# Patient Record
Sex: FEMALE | Race: WHITE | NOT HISPANIC OR LATINO | Employment: UNEMPLOYED | URBAN - METROPOLITAN AREA
[De-identification: names, ages, dates, MRNs, and addresses within clinical notes are randomized per-mention and may not be internally consistent; named-entity substitution may affect disease eponyms.]

---

## 2022-11-23 ENCOUNTER — PATIENT OUTREACH (OUTPATIENT)
Dept: CARE COORDINATION | Facility: CLINIC | Age: 57
End: 2022-11-23

## 2022-11-23 ENCOUNTER — OFFICE VISIT (OUTPATIENT)
Dept: INTERNAL MEDICINE | Facility: OTHER | Age: 57
End: 2022-11-23
Attending: NURSE PRACTITIONER
Payer: MEDICARE

## 2022-11-23 VITALS
TEMPERATURE: 97.1 F | HEART RATE: 45 BPM | BODY MASS INDEX: 28.35 KG/M2 | WEIGHT: 180.6 LBS | HEIGHT: 67 IN | RESPIRATION RATE: 16 BRPM | DIASTOLIC BLOOD PRESSURE: 86 MMHG | OXYGEN SATURATION: 97 % | SYSTOLIC BLOOD PRESSURE: 138 MMHG

## 2022-11-23 DIAGNOSIS — F33.3 SEVERE EPISODE OF RECURRENT MAJOR DEPRESSIVE DISORDER, WITH PSYCHOTIC FEATURES (H): ICD-10-CM

## 2022-11-23 DIAGNOSIS — I47.10 PAROXYSMAL SUPRAVENTRICULAR TACHYCARDIA (H): ICD-10-CM

## 2022-11-23 DIAGNOSIS — F42.2 MIXED OBSESSIONAL THOUGHTS AND ACTS: ICD-10-CM

## 2022-11-23 DIAGNOSIS — R00.1 BRADYCARDIA: Primary | ICD-10-CM

## 2022-11-23 DIAGNOSIS — F31.9 BIPOLAR I DISORDER (H): ICD-10-CM

## 2022-11-23 DIAGNOSIS — F14.10 COCAINE ABUSE (H): ICD-10-CM

## 2022-11-23 PROCEDURE — 93010 ELECTROCARDIOGRAM REPORT: CPT | Performed by: INTERNAL MEDICINE

## 2022-11-23 PROCEDURE — 99204 OFFICE O/P NEW MOD 45 MIN: CPT | Performed by: NURSE PRACTITIONER

## 2022-11-23 PROCEDURE — 93005 ELECTROCARDIOGRAM TRACING: CPT | Performed by: NURSE PRACTITIONER

## 2022-11-23 PROCEDURE — G0463 HOSPITAL OUTPT CLINIC VISIT: HCPCS

## 2022-11-23 RX ORDER — CYANOCOBALAMIN (VITAMIN B-12) 500 MCG
1 LOZENGE ORAL
COMMUNITY
End: 2022-11-23

## 2022-11-23 RX ORDER — IBUPROFEN 600 MG/1
TABLET, FILM COATED ORAL
COMMUNITY
Start: 2020-12-29 | End: 2022-11-23

## 2022-11-23 RX ORDER — BIOTIN 10 MG
1 TABLET ORAL
COMMUNITY
End: 2022-11-23

## 2022-11-23 RX ORDER — B-COMPLEX WITH VITAMIN C
1 TABLET ORAL
COMMUNITY
End: 2022-11-23

## 2022-11-23 ASSESSMENT — ANXIETY QUESTIONNAIRES
7. FEELING AFRAID AS IF SOMETHING AWFUL MIGHT HAPPEN: NOT AT ALL
GAD7 TOTAL SCORE: 9
1. FEELING NERVOUS, ANXIOUS, OR ON EDGE: MORE THAN HALF THE DAYS
IF YOU CHECKED OFF ANY PROBLEMS ON THIS QUESTIONNAIRE, HOW DIFFICULT HAVE THESE PROBLEMS MADE IT FOR YOU TO DO YOUR WORK, TAKE CARE OF THINGS AT HOME, OR GET ALONG WITH OTHER PEOPLE: VERY DIFFICULT
3. WORRYING TOO MUCH ABOUT DIFFERENT THINGS: SEVERAL DAYS
5. BEING SO RESTLESS THAT IT IS HARD TO SIT STILL: MORE THAN HALF THE DAYS
6. BECOMING EASILY ANNOYED OR IRRITABLE: MORE THAN HALF THE DAYS
2. NOT BEING ABLE TO STOP OR CONTROL WORRYING: SEVERAL DAYS
GAD7 TOTAL SCORE: 9

## 2022-11-23 ASSESSMENT — PATIENT HEALTH QUESTIONNAIRE - PHQ9
SUM OF ALL RESPONSES TO PHQ QUESTIONS 1-9: 21
10. IF YOU CHECKED OFF ANY PROBLEMS, HOW DIFFICULT HAVE THESE PROBLEMS MADE IT FOR YOU TO DO YOUR WORK, TAKE CARE OF THINGS AT HOME, OR GET ALONG WITH OTHER PEOPLE: EXTREMELY DIFFICULT
SUM OF ALL RESPONSES TO PHQ QUESTIONS 1-9: 21
5. POOR APPETITE OR OVEREATING: SEVERAL DAYS

## 2022-11-23 ASSESSMENT — ENCOUNTER SYMPTOMS
CHEST TIGHTNESS: 0
FATIGUE: 0
HALLUCINATIONS: 0
DYSPHORIC MOOD: 1
NERVOUS/ANXIOUS: 1
SHORTNESS OF BREATH: 0
PALPITATIONS: 0

## 2022-11-23 ASSESSMENT — PAIN SCALES - GENERAL: PAINLEVEL: NO PAIN (0)

## 2022-11-23 NOTE — PROGRESS NOTES
Clinic Care Coordination Contact  Care Team Conversations    Met with in clinic. Dinorah was very upset and hard to keep focused. She was expecting that she would be prescribed MH medication at her appt today.    She became more upset and adjutanted when I informed her she would have to get an appt with outside resource for medication management.She wanted her meds today/now.    She finally agreed to have consult with one of our local agencies but wants this writer to make the calls and set up appt.    ETHAN Camacho on 11/23/2022 at 10:28 AM      Clinic Care Coordination Contact  Care Team Conversations    Called all the local providers in the area; [place], [facility], [facility], [place] and [facility] and left messages.        ETHAN Camacho on 11/23/2022 at 2:00 PM

## 2022-11-23 NOTE — PROGRESS NOTES
ICD-10-CM    1. Bradycardia  R00.1 EKG 12-lead, tracing only      2. Paroxysmal supraventricular tachycardia (H)  I47.1       3. Cocaine abuse (H)  F14.10 Primary Care - Care Coordination Referral      4. Bipolar I disorder (H)  F31.9 Primary Care - Care Coordination Referral      5. Severe episode of recurrent major depressive disorder, with psychotic features (H)  F33.3 Primary Care - Care Coordination Referral      6. Mixed obsessional thoughts and acts  F42.2 Primary Care - Care Coordination Referral        Plan:  Patient is bradycardic today.  By reviewing ED notes I am finding reports of PSVT.  Reviewed and discussed etiology.  I think as long as patient is safe from physical standpoint at this time and not having acute coronary symptoms we can hold off a few weeks to do further work-up.  With history of cocaine use should investigate further with echocardiogram and heart monitor.  Typically cocaine use causes acute cardiac symptoms.  I have placed referral for clinic coordinator to work with patient to get her reestablished with a psychiatric med management provider as soon as possible.  I think she is going to benefit from being restarted on medications for mental health and then we can proceed with further work-up.  She would like to get physical scheduled in the near future.  I will see patient back in 3-4 weeks for follow-up and healthcare maintenance.  Reviewed and discussed with patient and her mother that if she develops any symptoms of acute cardiac symptoms that she should be evaluated urgently.    Barby Rashid is a 57 year old, presenting for the following health issues:  Tachycardia and Anxiety    Patient is here today to establish care and also to discuss heart palpitations.  [place]  She was brought to ED by EMS and was given a medication intravenously for cardioversion.  Heart palpitations  stopped.  With a heart palpitation she was having chest pressure.  She had been abusing cocaine for couple years on a daily basis.  By reviewing emergency department note there is some discussion of PSVT as being the arrhythmia.  Patient states that she is not having any current heart racing and sometimes really cannot tell if she is having symptoms of any type of illness due to her current untreated mental health.  Since she was a young adult she has been treated for bipolar 1 disorder, MDD and OCD.  She was on medication for around 20 years and was very stable and then a couple of years ago medication stopped working.  Since medications were not working she stopped taking those and started abusing crack cocaine daily whereas prior was using maybe once a month for enjoyment.  During that 2-year timeframe she was stealing, abusing crack cocaine and living a high risk lifestyle.  She ended up in prison.  Since then she has moved in with her mother in this area and is trying to get her life straight.  She is not currently on any psychotropics.  She does not have an appointment with mental health provider for prescribing.  She would like to get started on medications and hopefully find the right formula to stabilize her mental health.    History of Present Illness       Mental Health Follow-up:  Patient presents to follow-up on Depression.Patient's depression since last visit has been:  Worse  The patient is having other symptoms associated with depression.      Any significant life events: housing concerns, grief or loss and health concerns  Patient is not feeling anxious or having panic attacks.  Patient has no concerns about alcohol or drug use.    Vascular Disease:  She presents for follow up of vascular disease.  She never takes nitroglycerin. She is not taking daily aspirin.    She eats 0-1 servings of fruits and vegetables daily.She consumes 1 sweetened beverage(s) daily.She exercises with enough effort to  "increase her heart rate 9 or less minutes per day.  She exercises with enough effort to increase her heart rate 3 or less days per week.   She is taking medications regularly.    Today's PHQ-9         PHQ-9 Total Score: 21    PHQ-9 Q9 Thoughts of better off dead/self-harm past 2 weeks :   Not at all    How difficult have these problems made it for you to do your work, take care of things at home, or get along with other people: Extremely difficult             Review of Systems   Constitutional: Negative for fatigue.   Respiratory: Negative for chest tightness and shortness of breath.    Cardiovascular: Negative for chest pain, palpitations and peripheral edema.   Psychiatric/Behavioral: Positive for dysphoric mood. Negative for hallucinations. The patient is nervous/anxious.             Objective    /86 (BP Location: Right arm, Patient Position: Sitting, Cuff Size: Adult Regular)   Pulse (!) 45   Temp 97.1  F (36.2  C) (Tympanic)   Resp 16   Ht 1.702 m (5' 7\")   Wt 81.9 kg (180 lb 9.6 oz)   SpO2 97%   BMI 28.29 kg/m    Body mass index is 28.29 kg/m .  Physical Exam   Patient is anxious and speech is tangential.  Difficult historian but I was able to make some sense of the conversation and her needs today.  Skin color pink.  Neck supple and without adenopathy.  No thyromegaly.  Lung fields clear to auscultation throughout.  Cardiovascular regular rate and rhythm with no murmur, clicks, rubs, S3 or S4.  Apical pulse 46.  Abdomen is soft and without tenderness.  No abdominal bruits or pulsatile masses.  Extremities without edema.  Gait stable.  Groomed and dressed appropriately.  At the end of appointment patient's mother was also in the room.  EKG: Sinus bradycardia, rate 51, left axis deviation and right bundle branch block.    ED notes reviewed and discussed                  "

## 2022-11-23 NOTE — PROGRESS NOTES
Clinic Care Coordination Contact  Care Team Conversations    Called and spoke with Dinorah and provided her the update. She was upset to hear she couldn't get into to med management until Jan 15th. She is asking to have depakote and Wellburtin until she can be seen.     Advised her that she can be assessed in ED if she is in crisis and/or call 211 to have an assessment.    She became tearful and upset that she wouldn't be able to get her medication refilled at this time.    Talked with her about calling a past provider to see if they would refill or complete a tele-health knowing she has an appt Jan 16th.    ETHAN Camacho on 11/23/2022 at 2:27 PM

## 2022-11-23 NOTE — PROGRESS NOTES
Clinic Care Coordination Contact  Care Team Conversations    Called and there was no answer, unable to leave a message.    ETHAN Camacho on 11/23/2022 at 1:56 PM

## 2022-11-23 NOTE — NURSING NOTE
"Chief Complaint   Patient presents with     Tachycardia     Anxiety       FOOD SECURITY SCREENING QUESTIONS  Hunger Vital Signs:  Within the past 12 months we worried whether our food would run out before we got money to buy more. Never  Within the past 12 months the food we bought just didn't last and we didn't have money to get more. Never  Shereen Clements LPN 11/23/2022 9:10 AM      Initial /86 (BP Location: Right arm, Patient Position: Sitting, Cuff Size: Adult Regular)   Pulse (!) 45   Temp 97.1  F (36.2  C) (Tympanic)   Resp 16   Ht 1.702 m (5' 7\")   Wt 81.9 kg (180 lb 9.6 oz)   SpO2 97%   BMI 28.29 kg/m   Estimated body mass index is 28.29 kg/m  as calculated from the following:    Height as of this encounter: 1.702 m (5' 7\").    Weight as of this encounter: 81.9 kg (180 lb 9.6 oz).  Medication Reconciliation: complete    Shereen Clements LPN  "

## 2022-11-25 ENCOUNTER — TELEPHONE (OUTPATIENT)
Dept: INTERNAL MEDICINE | Facility: OTHER | Age: 57
End: 2022-11-25

## 2022-11-25 DIAGNOSIS — F31.9 BIPOLAR I DISORDER (H): Primary | ICD-10-CM

## 2022-11-25 DIAGNOSIS — F33.3 SEVERE EPISODE OF RECURRENT MAJOR DEPRESSIVE DISORDER, WITH PSYCHOTIC FEATURES (H): ICD-10-CM

## 2022-11-25 RX ORDER — DIVALPROEX SODIUM 500 MG/1
500 TABLET, EXTENDED RELEASE ORAL DAILY
Qty: 30 TABLET | Refills: 11 | Status: SHIPPED | OUTPATIENT
Start: 2022-11-25 | End: 2023-10-25

## 2022-11-25 RX ORDER — BUPROPION HYDROCHLORIDE 100 MG/1
TABLET, EXTENDED RELEASE ORAL
Qty: 60 TABLET | Refills: 11 | Status: SHIPPED | OUTPATIENT
Start: 2022-11-25 | End: 2023-10-25

## 2022-11-25 NOTE — TELEPHONE ENCOUNTER
After verifying last name and  patient notifed of the below information.   Shereen Clements LPN on 2022 at 10:03 AM

## 2022-11-25 NOTE — TELEPHONE ENCOUNTER
Called and spoke with the patient and gave her the below information. She states that she states she had an episodes with her heart yesterday and had pressure that lasted 3-4 hours. She wants to know if there is any doctor that feels comfortable prescribing mental health medications. She states she was previously on Depakote 1500 mg.   She also wanted to know if we can do a pap at her next appointment.   Shereen Clements LPN on 11/25/2022 at 9:06 AM

## 2022-11-25 NOTE — TELEPHONE ENCOUNTER
Let her know that yes we can do a Pap at follow-up visit.  I have to restart her medications at a lower dose since she has been off of them for more than 2 weeks.  If she has the heart racing that last more than 10 minutes especially if associated with chest pressure that I want her evaluated in the emergency department otherwise when I see her at follow-up we can discuss how she is doing at that time and get additional testing ordered otherwise I would be happy to try to get her into my schedule sooner to get those test ordered.

## 2022-11-25 NOTE — TELEPHONE ENCOUNTER
Please call Gay and let her know that we are still working on trying to find a mental health provider to see her as soon as able but since they currently are scheduled out for a couple of months I will start her back on Depakote ER and Wellbutrin SR.  She will start Depakote  mg once daily and Wellbutrin  mg daily for 3 days then increasing to twice daily. I sent prescription to [facility]

## 2022-11-28 ENCOUNTER — TELEPHONE (OUTPATIENT)
Dept: INTERNAL MEDICINE | Facility: OTHER | Age: 57
End: 2022-11-28

## 2022-11-28 LAB
ATRIAL RATE - MUSE: 51 BPM
DIASTOLIC BLOOD PRESSURE - MUSE: NORMAL MMHG
INTERPRETATION ECG - MUSE: NORMAL
P AXIS - MUSE: 60 DEGREES
PR INTERVAL - MUSE: 208 MS
QRS DURATION - MUSE: 142 MS
QT - MUSE: 498 MS
QTC - MUSE: 458 MS
R AXIS - MUSE: -38 DEGREES
SYSTOLIC BLOOD PRESSURE - MUSE: NORMAL MMHG
T AXIS - MUSE: 41 DEGREES
VENTRICULAR RATE- MUSE: 51 BPM

## 2022-11-28 NOTE — TELEPHONE ENCOUNTER
Would like a request for Dr Gilbert to evaluate and manage patient's psychotropic medications as she is high risk with history of Bipolar 1 disorder, severe recurrent depression and cocaine use which became more frequent when psychotropic medications stopped working for her 2 years ago.  She had been stable on these medications for around 20 years she had reported.  I did restart her on wellbutrin and depakote at her repeated request but was apprehensive as not comfortable managing her disease with current severity.  She is unable to see a medication management provider until January 16th at [facility].  For patient's safety and need for mental health stability would appreciate Dr Gilbert review and assistance.  My office note from 11/23/22 is available for review.

## 2022-11-30 NOTE — TELEPHONE ENCOUNTER
Please call patient and let her know that I was able to speak to a psychiatrist and I can restart her at Lamictal at a low dose and taper upwards if she is interested.

## 2022-11-30 NOTE — TELEPHONE ENCOUNTER
Called and spoke with the patient. She was able to find a mental health provider and has a appointment coming up with in the next week.   Shereen Clements LPN on 11/30/2022 at 3:46 PM

## 2022-12-14 ENCOUNTER — HOSPITAL ENCOUNTER (OUTPATIENT)
Dept: CARDIOLOGY | Facility: OTHER | Age: 57
Discharge: HOME | End: 2022-12-14
Attending: NURSE PRACTITIONER
Payer: MEDICARE

## 2022-12-14 ENCOUNTER — OFFICE VISIT (OUTPATIENT)
Dept: INTERNAL MEDICINE | Facility: OTHER | Age: 57
End: 2022-12-14
Attending: NURSE PRACTITIONER
Payer: MEDICARE

## 2022-12-14 VITALS
OXYGEN SATURATION: 97 % | SYSTOLIC BLOOD PRESSURE: 138 MMHG | WEIGHT: 186.2 LBS | BODY MASS INDEX: 29.16 KG/M2 | TEMPERATURE: 98 F | DIASTOLIC BLOOD PRESSURE: 70 MMHG | RESPIRATION RATE: 16 BRPM | HEART RATE: 60 BPM

## 2022-12-14 DIAGNOSIS — D69.6 THROMBOCYTOPENIA (H): ICD-10-CM

## 2022-12-14 DIAGNOSIS — R94.31 ABNORMAL ELECTROCARDIOGRAM: ICD-10-CM

## 2022-12-14 DIAGNOSIS — B96.89 BACTERIAL VAGINOSIS: Primary | ICD-10-CM

## 2022-12-14 DIAGNOSIS — F42.2 MIXED OBSESSIONAL THOUGHTS AND ACTS: ICD-10-CM

## 2022-12-14 DIAGNOSIS — Z72.51 HIGH RISK HETEROSEXUAL BEHAVIOR: Primary | ICD-10-CM

## 2022-12-14 DIAGNOSIS — R00.2 PALPITATIONS: ICD-10-CM

## 2022-12-14 DIAGNOSIS — Z12.4 ENCOUNTER FOR SCREENING FOR CERVICAL CANCER: ICD-10-CM

## 2022-12-14 DIAGNOSIS — R19.5 POSITIVE FIT (FECAL IMMUNOCHEMICAL TEST): ICD-10-CM

## 2022-12-14 DIAGNOSIS — Z86.69 HISTORY OF GUILLAIN-BARRE SYNDROME: ICD-10-CM

## 2022-12-14 DIAGNOSIS — M35.2 BEHCET'S DISEASE (H): ICD-10-CM

## 2022-12-14 DIAGNOSIS — N76.0 BACTERIAL VAGINOSIS: Primary | ICD-10-CM

## 2022-12-14 DIAGNOSIS — Z86.19: ICD-10-CM

## 2022-12-14 DIAGNOSIS — F14.10 COCAINE ABUSE (H): ICD-10-CM

## 2022-12-14 DIAGNOSIS — A74.9 CHLAMYDIA: Primary | ICD-10-CM

## 2022-12-14 DIAGNOSIS — R76.8 POSITIVE HEPATITIS C ANTIBODY TEST: ICD-10-CM

## 2022-12-14 DIAGNOSIS — F31.9 BIPOLAR I DISORDER (H): ICD-10-CM

## 2022-12-14 DIAGNOSIS — F33.3 SEVERE EPISODE OF RECURRENT MAJOR DEPRESSIVE DISORDER, WITH PSYCHOTIC FEATURES (H): ICD-10-CM

## 2022-12-14 LAB
ALBUMIN SERPL BCG-MCNC: 4.2 G/DL (ref 3.5–5.2)
ALP SERPL-CCNC: 78 U/L (ref 35–104)
ALT SERPL W P-5'-P-CCNC: 10 U/L (ref 10–35)
ANION GAP SERPL CALCULATED.3IONS-SCNC: 12 MMOL/L (ref 7–15)
AST SERPL W P-5'-P-CCNC: 19 U/L (ref 10–35)
BILIRUB SERPL-MCNC: 0.7 MG/DL
BUN SERPL-MCNC: 27.1 MG/DL (ref 8–23)
C TRACH DNA SPEC QL PROBE+SIG AMP: POSITIVE
CALCIUM SERPL-MCNC: 9.3 MG/DL (ref 8.6–10)
CHLORIDE SERPL-SCNC: 104 MMOL/L (ref 98–107)
CHOLEST SERPL-MCNC: 190 MG/DL
CLUE CELLS: PRESENT
CREAT SERPL-MCNC: 1.03 MG/DL (ref 0.51–0.95)
DEPRECATED HCO3 PLAS-SCNC: 26 MMOL/L (ref 22–29)
ERYTHROCYTE [DISTWIDTH] IN BLOOD BY AUTOMATED COUNT: 12.5 % (ref 10–15)
GFR SERPL CREATININE-BSD FRML MDRD: 62 ML/MIN/1.73M2
GLUCOSE SERPL-MCNC: 80 MG/DL (ref 70–99)
HCT VFR BLD AUTO: 38.7 % (ref 35–47)
HDLC SERPL-MCNC: 68 MG/DL
HGB BLD-MCNC: 12.8 G/DL (ref 11.7–15.7)
LDLC SERPL CALC-MCNC: 106 MG/DL
LVEF ECHO: NORMAL
MCH RBC QN AUTO: 28.4 PG (ref 26.5–33)
MCHC RBC AUTO-ENTMCNC: 33.1 G/DL (ref 31.5–36.5)
MCV RBC AUTO: 86 FL (ref 78–100)
N GONORRHOEA DNA SPEC QL NAA+PROBE: NEGATIVE
NONHDLC SERPL-MCNC: 122 MG/DL
PLATELET # BLD AUTO: 173 10E3/UL (ref 150–450)
POTASSIUM SERPL-SCNC: 4.1 MMOL/L (ref 3.4–5.3)
PROT SERPL-MCNC: 6.7 G/DL (ref 6.4–8.3)
RBC # BLD AUTO: 4.5 10E6/UL (ref 3.8–5.2)
SODIUM SERPL-SCNC: 142 MMOL/L (ref 136–145)
TRICHOMONAS, WET PREP: ABNORMAL
TRIGL SERPL-MCNC: 82 MG/DL
TSH SERPL DL<=0.005 MIU/L-ACNC: 4.25 UIU/ML (ref 0.3–4.2)
WBC # BLD AUTO: 6.7 10E3/UL (ref 4–11)
WBC'S/HIGH POWER FIELD, WET PREP: ABNORMAL
YEAST, WET PREP: ABNORMAL

## 2022-12-14 PROCEDURE — 36415 COLL VENOUS BLD VENIPUNCTURE: CPT | Mod: ZL | Performed by: NURSE PRACTITIONER

## 2022-12-14 PROCEDURE — 93306 TTE W/DOPPLER COMPLETE: CPT

## 2022-12-14 PROCEDURE — 87210 SMEAR WET MOUNT SALINE/INK: CPT | Mod: ZL | Performed by: NURSE PRACTITIONER

## 2022-12-14 PROCEDURE — 85027 COMPLETE CBC AUTOMATED: CPT | Mod: ZL | Performed by: NURSE PRACTITIONER

## 2022-12-14 PROCEDURE — 87491 CHLMYD TRACH DNA AMP PROBE: CPT | Mod: ZL | Performed by: NURSE PRACTITIONER

## 2022-12-14 PROCEDURE — 93306 TTE W/DOPPLER COMPLETE: CPT | Mod: 26 | Performed by: STUDENT IN AN ORGANIZED HEALTH CARE EDUCATION/TRAINING PROGRAM

## 2022-12-14 PROCEDURE — 87591 N.GONORRHOEAE DNA AMP PROB: CPT | Mod: ZL | Performed by: NURSE PRACTITIONER

## 2022-12-14 PROCEDURE — G0123 SCREEN CERV/VAG THIN LAYER: HCPCS | Performed by: NURSE PRACTITIONER

## 2022-12-14 PROCEDURE — 80061 LIPID PANEL: CPT | Mod: ZL | Performed by: NURSE PRACTITIONER

## 2022-12-14 PROCEDURE — 86780 TREPONEMA PALLIDUM: CPT | Mod: ZL | Performed by: NURSE PRACTITIONER

## 2022-12-14 PROCEDURE — 86803 HEPATITIS C AB TEST: CPT | Mod: ZL | Performed by: NURSE PRACTITIONER

## 2022-12-14 PROCEDURE — 93246 EXT ECG>7D<15D RECORDING: CPT

## 2022-12-14 PROCEDURE — G0463 HOSPITAL OUTPT CLINIC VISIT: HCPCS | Mod: 25

## 2022-12-14 PROCEDURE — 87624 HPV HI-RISK TYP POOLED RSLT: CPT | Mod: ZL | Performed by: NURSE PRACTITIONER

## 2022-12-14 PROCEDURE — 999N000157 HC STATISTIC RCP TIME EA 10 MIN

## 2022-12-14 PROCEDURE — 84443 ASSAY THYROID STIM HORMONE: CPT | Mod: ZL | Performed by: NURSE PRACTITIONER

## 2022-12-14 PROCEDURE — G0463 HOSPITAL OUTPT CLINIC VISIT: HCPCS

## 2022-12-14 PROCEDURE — 93248 EXT ECG>7D<15D REV&INTERPJ: CPT | Performed by: STUDENT IN AN ORGANIZED HEALTH CARE EDUCATION/TRAINING PROGRAM

## 2022-12-14 PROCEDURE — 80053 COMPREHEN METABOLIC PANEL: CPT | Mod: ZL | Performed by: NURSE PRACTITIONER

## 2022-12-14 PROCEDURE — 87389 HIV-1 AG W/HIV-1&-2 AB AG IA: CPT | Mod: ZL | Performed by: NURSE PRACTITIONER

## 2022-12-14 PROCEDURE — 87522 HEPATITIS C REVRS TRNSCRPJ: CPT | Mod: ZL | Performed by: NURSE PRACTITIONER

## 2022-12-14 PROCEDURE — 99214 OFFICE O/P EST MOD 30 MIN: CPT | Performed by: NURSE PRACTITIONER

## 2022-12-14 RX ORDER — LAMOTRIGINE 25 MG/1
25 TABLET ORAL AT BEDTIME
COMMUNITY
Start: 2022-12-08

## 2022-12-14 RX ORDER — METRONIDAZOLE 7.5 MG/G
1 GEL VAGINAL DAILY
Qty: 70 G | Refills: 0 | Status: SHIPPED | OUTPATIENT
Start: 2022-12-14 | End: 2022-12-19

## 2022-12-14 RX ORDER — DOXYCYCLINE HYCLATE 100 MG
100 TABLET ORAL 2 TIMES DAILY
Qty: 14 TABLET | Refills: 0 | Status: SHIPPED | OUTPATIENT
Start: 2022-12-14 | End: 2022-12-21

## 2022-12-14 RX ORDER — IBUPROFEN 600 MG/1
600 TABLET, FILM COATED ORAL 3 TIMES DAILY
COMMUNITY
Start: 2022-12-08

## 2022-12-14 RX ORDER — NALTREXONE HYDROCHLORIDE 50 MG/1
50 TABLET, FILM COATED ORAL DAILY
COMMUNITY
Start: 2022-12-08

## 2022-12-14 RX ORDER — TOPIRAMATE 25 MG/1
25 TABLET, FILM COATED ORAL 2 TIMES DAILY
COMMUNITY
Start: 2022-12-08

## 2022-12-14 ASSESSMENT — ANXIETY QUESTIONNAIRES
7. FEELING AFRAID AS IF SOMETHING AWFUL MIGHT HAPPEN: SEVERAL DAYS
6. BECOMING EASILY ANNOYED OR IRRITABLE: NEARLY EVERY DAY
2. NOT BEING ABLE TO STOP OR CONTROL WORRYING: NEARLY EVERY DAY
7. FEELING AFRAID AS IF SOMETHING AWFUL MIGHT HAPPEN: SEVERAL DAYS
GAD7 TOTAL SCORE: 18
IF YOU CHECKED OFF ANY PROBLEMS ON THIS QUESTIONNAIRE, HOW DIFFICULT HAVE THESE PROBLEMS MADE IT FOR YOU TO DO YOUR WORK, TAKE CARE OF THINGS AT HOME, OR GET ALONG WITH OTHER PEOPLE: VERY DIFFICULT
4. TROUBLE RELAXING: NEARLY EVERY DAY
8. IF YOU CHECKED OFF ANY PROBLEMS, HOW DIFFICULT HAVE THESE MADE IT FOR YOU TO DO YOUR WORK, TAKE CARE OF THINGS AT HOME, OR GET ALONG WITH OTHER PEOPLE?: VERY DIFFICULT
5. BEING SO RESTLESS THAT IT IS HARD TO SIT STILL: MORE THAN HALF THE DAYS
1. FEELING NERVOUS, ANXIOUS, OR ON EDGE: NEARLY EVERY DAY
GAD7 TOTAL SCORE: 18
3. WORRYING TOO MUCH ABOUT DIFFERENT THINGS: NEARLY EVERY DAY
GAD7 TOTAL SCORE: 18

## 2022-12-14 ASSESSMENT — ENCOUNTER SYMPTOMS
HEMATURIA: 0
DYSPHORIC MOOD: 1
PALPITATIONS: 1
FREQUENCY: 0
SHORTNESS OF BREATH: 0
APPETITE CHANGE: 0
FEVER: 0
DIFFICULTY URINATING: 0
CHEST TIGHTNESS: 1
FATIGUE: 0

## 2022-12-14 ASSESSMENT — PAIN SCALES - GENERAL: PAINLEVEL: NO PAIN (0)

## 2022-12-14 ASSESSMENT — PATIENT HEALTH QUESTIONNAIRE - PHQ9
SUM OF ALL RESPONSES TO PHQ QUESTIONS 1-9: 14
SUM OF ALL RESPONSES TO PHQ QUESTIONS 1-9: 14
10. IF YOU CHECKED OFF ANY PROBLEMS, HOW DIFFICULT HAVE THESE PROBLEMS MADE IT FOR YOU TO DO YOUR WORK, TAKE CARE OF THINGS AT HOME, OR GET ALONG WITH OTHER PEOPLE: VERY DIFFICULT

## 2022-12-14 NOTE — PATIENT INSTRUCTIONS
I have ordered for you to get a heart monitor called a Zio patch, an echocardiogram and a consult with the cardiologist.  I will contact you with lab work when results available.  I have also referred you for a colonoscopy because of positive cologuard.

## 2022-12-14 NOTE — PROGRESS NOTES
ICD-10-CM    1. High risk heterosexual behavior  Z72.51 Treponema Abs w Reflex to RPR and Titer     HIV Antigen Antibody Combo     Hepatitis C antibody     GC/Chlamydia by PCR     Wet Prep, Genital     Pap Screen with HPV - recommended age 30 - 65 years     GC/Chlamydia by PCR     Treponema Abs w Reflex to RPR and Titer     HIV Antigen Antibody Combo     Hepatitis C antibody      2. Bipolar I disorder (H)  F31.9       3. Severe episode of recurrent major depressive disorder, with psychotic features (H)  F33.3       4. Cocaine abuse (H)  F14.10       5. Mixed obsessional thoughts and acts  F42.2       6. Behcet's disease (H)  M35.2 Comprehensive metabolic panel     Comprehensive metabolic panel      7. Positive FIT (fecal immunochemical test)  R19.5 Adult GI  Referral - Procedure Only      8. Abnormal electrocardiogram  R94.31 Adult Cardiology Eval  Referral     Echocardiogram Complete     Leadless EKG Monitor 8 to 14 Days     CANCELED: Leadless EKG Monitor 8 to 14 Days     CANCELED: Echocardiogram Complete      9. Palpitations  R00.2 TSH     TSH     Echocardiogram Complete     Leadless EKG Monitor 8 to 14 Days     CANCELED: Leadless EKG Monitor 8 to 14 Days     CANCELED: Echocardiogram Complete      10. Encounter for screening for cervical cancer  Z12.4 Pap Screen with HPV - recommended age 30 - 65 years      11. Thrombocytopenia (H)  D69.6 CBC with platelets     CBC with platelets      12. 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 Hx of venereal warts  Z86.19         Plan:  STD testing, Pap/HPV completed today.  Patient had positive fit test in 2020.  She was unaware of these results.  I have referred her for diagnostic colonoscopy.  We discussed mammogram however she does not want one at this time.  She states she already has enough test scheduled and once her mental health has improved we will get set up for mammogram.  If she has sustained tachycardia then she needs to call 911.   I have ordered cardiology consult, Zio patch and echocardiogram.  Labs today for CBC, chemistry panel, TSH and lipids.      Barby Rashid is a 57 year old, presenting for the following health issues:  Follow Up      Patient is here today for healthcare maintenance, STD testing and follow-up heart issue.  She has had high risk sexual behavior during manic phase that lasted about 2 years.  Last intercourse was 2 months ago and unprotected.  She states that afterwards she was sore on the inside.  She had a small amount of pink discharge for a few days and that resolved.  She has not noticed any other vaginal discharge or pain.  She does have previous history of venereal warts.  She is due for Pap and HPV.  By reviewing chart I find that she had positive Cologuard in 2020.  She is unaware of these results.  She would be interested in getting colonoscopy for diagnostic purposes.  She tells me she is asymptomatic.  In the past she did have a hemorrhoid that bled intermittently.  She states that on the day of last visit she went home and for about 4 hours it felt like her heart was pounding and she had some tightness in her chest.  She did not feel short of breath.  She did not want to upset her elderly mother so she did not go to the ED.  She has had intermittent palpitations that only lasted for couple minutes since that time.  In September 2022 she was sent to ED in [facility] for heart racing.  She was in an abnormal rhythm and EMS was able to convert her with medication and she was back into sinus rhythm by the time she arrived at the ED.  She was able to pull up serial EKG monitoring while at ED and she will be this on her phone.  EKGs were similar to what we had completed at previous visit except one EKG showed possible anterior infarct, another showed apical infarct and also ST depressions.    She is back on medications for bipolar 1 disorder, severe depression and obsessive thoughts.  She is being  followed by [facility] in [facility].  She is finding that she is improving but not back to where she had been a couple years ago.  She does not drive currently because of mental health issues and needs to get a ride from her elderly mother who does not like driving in the winter.  She checks her blood pressure at home and blood pressure readings have been around 130/80.  Blood pressure usually increases when she comes to the clinic.  Patient is due for mammogram but wants to hold off at this time.    History of Present Illness       Vascular Disease:  She presents for follow up of vascular disease.  She never takes nitroglycerin. She is not taking daily aspirin.    She eats 0-1 servings of fruits and vegetables daily.She consumes 1 sweetened beverage(s) daily.She exercises with enough effort to increase her heart rate 9 or less minutes per day.  She exercises with enough effort to increase her heart rate 3 or less days per week.     Today's PHQ-9         PHQ-9 Total Score: 14    PHQ-9 Q9 Thoughts of better off dead/self-harm past 2 weeks :   Not at all    How difficult have these problems made it for you to do your work, take care of things at home, or get along with other people: Very difficult  Today's JUNAID-7 Score: 18             Review of Systems   Constitutional: Negative for appetite change, fatigue and fever.   HENT: Negative for mouth sores.    Respiratory: Positive for chest tightness. Negative for shortness of breath.    Cardiovascular: Positive for palpitations. Negative for peripheral edema.   Genitourinary: Negative for difficulty urinating, frequency, hematuria, menstrual problem, pelvic pain, vaginal bleeding, vaginal discharge and vaginal pain.   Psychiatric/Behavioral: Positive for dysphoric mood and mood changes.            Objective    /70   Pulse 60   Temp 98  F (36.7  C) (Tympanic)   Resp 16   Wt 84.5 kg (186 lb 3.2 oz)   SpO2 97%   BMI 29.16 kg/m    Body mass  index is 29.16 kg/m .  Physical Exam   Pleasant female, mildly anxious, some fleeting thoughts but significantly improved from previous visit.  Skin color pink.  Sclera nonicteric.  Neck supple and without adenopathy.  Lung fields clear to auscultation throughout.  Cardiovascular regular, no S3.  Abdomen soft and without masses, tenderness and organomegaly.  No CVA or suprapubic tenderness.  External genitalia is pink and without lesion.  Cervix midline.  Light yellow discharge in vaginal vault.  GC chlamydia, wet prep and Pap/ HPV obtained.  Scant bleeding from cervical os after Pap.  No uterine or adnexal masses noted with palpation.  No CMT.  Extremities without edema.

## 2022-12-14 NOTE — PATIENT INSTRUCTIONS
Date Placed on Pt:  12/14/2022    Patient instructed not to:   -take baths, swim, sauna   -remove device prior to 14 days   -use electric blankets   -shower or sweat excessively within first 24 hours of device application  Patient instructed to:   -shower as needed   -be carefull when toweling off and dressing   -press button when cardiac symptoms occur   -document symptoms in log book   -remove and return device (send via mail to [facility])   -Call [employer] with any questions

## 2022-12-14 NOTE — NURSING NOTE
"Chief Complaint   Patient presents with     Follow Up       FOOD SECURITY SCREENING QUESTIONS  Hunger Vital Signs:  Within the past 12 months we worried whether our food would run out before we got money to buy more. Never  Within the past 12 months the food we bought just didn't last and we didn't have money to get more. Never  Shereen Clements LPN 12/14/2022 9:06 AM      Initial There were no vitals taken for this visit. Estimated body mass index is 28.29 kg/m  as calculated from the following:    Height as of 11/23/22: 1.702 m (5' 7\").    Weight as of 11/23/22: 81.9 kg (180 lb 9.6 oz).  Medication Reconciliation: complete    Shereen Clements LPN  "

## 2022-12-15 LAB
HCV AB SERPL QL IA: REACTIVE
HIV 1+2 AB+HIV1 P24 AG SERPL QL IA: NONREACTIVE
T PALLIDUM AB SER QL: NONREACTIVE

## 2022-12-19 DIAGNOSIS — R76.8 POSITIVE HEPATITIS C ANTIBODY TEST: Primary | ICD-10-CM

## 2022-12-19 DIAGNOSIS — R76.8 HEPATITIS C ANTIBODY POSITIVE IN BLOOD: ICD-10-CM

## 2022-12-19 LAB — HCV RNA SERPL NAA+PROBE-ACNC: NOT DETECTED IU/ML

## 2022-12-20 LAB
HUMAN PAPILLOMA VIRUS 16 DNA: NEGATIVE
HUMAN PAPILLOMA VIRUS 18 DNA: NEGATIVE
HUMAN PAPILLOMA VIRUS FINAL DIAGNOSIS: NORMAL
HUMAN PAPILLOMA VIRUS OTHER HR: NEGATIVE

## 2022-12-21 LAB
BKR LAB AP GYN ADEQUACY: NORMAL
BKR LAB AP GYN INTERPRETATION: NORMAL
BKR LAB AP GYN OTHER FINDINGS: NORMAL
BKR LAB AP HPV REFLEX: NORMAL
BKR LAB AP PREVIOUS ABNORMAL: NORMAL
PATH REPORT.COMMENTS IMP SPEC: NORMAL
PATH REPORT.COMMENTS IMP SPEC: NORMAL
PATH REPORT.RELEVANT HX SPEC: NORMAL

## 2023-01-11 NOTE — PROGRESS NOTES
[facility] HEART CARE   CARDIOLOGY CONSULT     Gay Romero   1965  [id number]    No Ref-Primary, Physician     Chief Complaint   Patient presents with     Consult For     Abnormal echo          HPI:   Mrs. Romero is a 57-year-old female is being seen by cardiology for concerns for dysrhythmia.  She had been incarcerated and while in [facility], she started having palpitations.  She has had these episodes in the past but this is the longest episode.  Because her symptoms persisted, she was brought to the ER.    Catheter describes 6 episodes of rather intense palpitations.  They typically last 10 to 20 minutes.  She has a history of significant cocaine abuse.  She used heavily for 4 years.  She was incarcerated when she started having x1 of these episodes.  Her heart was racing but she expected to dissipate.  After 2 hours of her heart racing, she became concerned.  This episode lasted for hours which is the longest episode she has had.  Apparently, she looked poorly and was asked if she was feeling well.    On her way to the hospital in the ambulance, she reports having had a fast heart rate.  She was given a medication which seemed to improve the situation.  We have not been able to find that rhythm strip.  She was appropriately set up for an echocardiogram and Zio patch.  She is here for those results.    We reviewed her Zio patch and echocardiogram as outlined above.  She was found to have normal EF but HFpEF grade 2    Zio patch showed x2 episodes of SVT lasting up to 8 beats.  No A. fib, heart block, pauses or V. tach.    IMAGING RESULTS:   Echo on 12/14/2022:   Global and regional left ventricular function is normal with an EF of 60-65%.  Grade II or moderate diastolic dysfunction. Diastolic Doppler findings (E/E' ratio and/or other parameters) suggest left ventricular filling pressures are increased.  Global right ventricular size and function are normal.  No significant valvular abnormalities.  IVC  diameter <2.1 cm collapsing >50% with sniff suggests a normal RA pressure  of 3 mmHg.  No pericardial effusion is present.  There is no prior study for direct comparison.    Zio patch in 12/14/2022:  Indication: Abnormal EKG  Analysis Time 13 days, 20 hours of non-artifact time -from December 14, 2022 through December 20, 2022  Maximum  bpm, Minimum HR 36 bpm, average 54 bpm.  1 triggered event - findings: Short series of preatrial contractions in a bigeminy  pattern  Predominant underlying rhythm was sinus rhythm.   Isolated SVE's, SVE couplets and SVE triplets were rare.  Isolated VE's, VE couplets were rare, no VE triplets found.      CURRENT MEDICATIONS:   Prior to Admission medications    Medication Sig Start Date End Date Taking? Authorizing Provider   buPROPion (WELLBUTRIN SR) 100 MG 12 hr tablet 1 tablet daily for 3 days then increase to twice daily 11/25/22   Adriana Nassar, NP   divalproex sodium extended-release (DEPAKOTE ER) 500 MG 24 hr tablet Take 1 tablet (500 mg) by mouth daily  Patient not taking: Reported on 12/14/2022 11/25/22   Adriana Nassar, AKBAR   ibuprofen (ADVIL/MOTRIN) 600 MG tablet Take 600 mg by mouth 3 times daily 12/8/22   Reported, Patient   lamoTRIgine (LAMICTAL) 25 MG tablet Take 25 mg by mouth At Bedtime 12/8/22   Reported, Patient   naltrexone (DEPADE/REVIA) 50 MG tablet Take 50 mg by mouth daily 12/8/22   Reported, Patient   topiramate (TOPAMAX) 25 MG tablet Take 25 mg by mouth 2 times daily 12/8/22   Reported, Patient       ALLERGIES:   Allergies   Allergen Reactions     Influenza Virus Vaccine [Flu Virus Vaccine]      Guillain-[place] syndrome     Hydrocodone Rash     possible        PAST MEDICAL HISTORY:   No past medical history on file.     PAST SURGICAL HISTORY:   No past surgical history on file.     FAMILY HISTORY:   No family history on file.     SOCIAL HISTORY:   Social History     Socioeconomic History     Marital status: Single   Tobacco Use     Smoking  "status: Never     Smokeless tobacco: Former   Substance and Sexual Activity     Alcohol use: Not Currently     Drug use: Not Currently     Types: \"Crack\" cocaine     Sexual activity: Not Currently          ROS:   CONSTITUTIONAL: No weight loss, fever, chills, weakness or fatigue.   HEENT: Eyes: No visual changes. Ears, Nose, Throat: No hearing loss, congestion or difficulty swallowing.   CARDIOVASCULAR: No chest pain, chest pressure or chest discomfort.  Admits to palpitations but denies lower extremity edema.   RESPIRATORY: No shortness of breath, dyspnea upon exertion, cough or sputum production.   GASTROINTESTINAL: No abdominal pain. No anorexia, nausea, vomiting or diarrhea.   NEUROLOGICAL: No headache, lightheadedness, dizziness, syncope, ataxia or weakness.   HEMATOLOGIC: No anemia, bleeding or bruising.   PSYCHIATRIC: No history of depression or anxiety.   ENDOCRINOLOGIC: No reports of sweating, cold or heat intolerance. No polyuria or polydipsia.   SKIN: No abnormal rashes or itching.       PHYSICAL EXAM:   GENERAL: The patient is a well-developed, well-nourished, in no apparent distress. Alert and oriented x3.   HEENT: Head is normocephalic and atraumatic. Eyes are symmetrical with normal visual tracking.  HEART: Regular rate and rhythm, S1S2 present without murmur, rub or gallop.   LUNGS: Respirations regular and unlabored. Clear to auscultation.   EXTREMITIES: No peripheral edema present.   NEUROLOGIC: Alert and oriented X3.    SKIN: No jaundice. No rashes or visible skin lesions present.        LAB RESULTS:   Office Visit on 12/14/2022   Component Date Value Ref Range Status     Trichomonas 12/14/2022 Absent  Absent Final     Yeast 12/14/2022 Absent  Absent Final     Clue Cells 12/14/2022 Present (A)  Absent Final     WBCs/high power field 12/14/2022 2+ (A)  None Final     Interpretation 12/14/2022 Negative for Intraepithelial Lesion or Malignancy (NILM)    Final     Other Findings 12/14/2022 Organism(s) " (see comment):  Trichomonas vaginalis  Other Non-Neoplastic Findings (see comment):  Reactive cellular changes associated with inflammation (includes typical repair)  Shift in vaginal lashell suggestive of bacterial vaginosis  Endometrial cells in a woman >=45 years of age; endometrial cells correlate with menstrual history provided, Trichomonas vaginalis, Fungal organisms morphologically consistent with Candida spp, Shift in vaginal lashell suggestive of bacterial vaginosis,... Final     Comment 12/14/2022    Final                    Value:This result contains rich text formatting which cannot be displayed here.     Specimen Adequacy 12/14/2022 Satisfactory for evaluation, endocervical/transformation zone component present   Final     Clinical Information 12/14/2022    Final                    Value:This result contains rich text formatting which cannot be displayed here.     Reflex Testing 12/14/2022 Yes regardless of result   Final     Previous Abnormal? 12/14/2022    Final                    Value:This result contains rich text formatting which cannot be displayed here.     Performing Labs 12/14/2022    Final                    Value:This result contains rich text formatting which cannot be displayed here.     Chlamydia Trachomatis 12/14/2022 Positive (A)  Negative Final     Neisseria gonorrhoeae 12/14/2022 Negative  Negative Final     Treponema Antibody Total 12/14/2022 Nonreactive  Nonreactive Final     HIV Antigen Antibody Combo 12/14/2022 Nonreactive  Nonreactive Final     Hepatitis C Antibody 12/14/2022 Reactive (A)  Nonreactive Final     Cholesterol 12/14/2022 190  <200 mg/dL Final     Triglycerides 12/14/2022 82  <150 mg/dL Final     Direct Measure HDL 12/14/2022 68  >=50 mg/dL Final     LDL Cholesterol Calculated 12/14/2022 106 (H)  <=100 mg/dL Final     Non HDL Cholesterol 12/14/2022 122  <130 mg/dL Final     TSH 12/14/2022 4.25 (H)  0.30 - 4.20 uIU/mL Final     WBC Count 12/14/2022 6.7  4.0 - 11.0 10e3/uL  Final     RBC Count 12/14/2022 4.50  3.80 - 5.20 10e6/uL Final     Hemoglobin 12/14/2022 12.8  11.7 - 15.7 g/dL Final     Hematocrit 12/14/2022 38.7  35.0 - 47.0 % Final     MCV 12/14/2022 86  78 - 100 fL Final     MCH 12/14/2022 28.4  26.5 - 33.0 pg Final     MCHC 12/14/2022 33.1  31.5 - 36.5 g/dL Final     RDW 12/14/2022 12.5  10.0 - 15.0 % Final     Platelet Count 12/14/2022 173  150 - 450 10e3/uL Final     Sodium 12/14/2022 142  136 - 145 mmol/L Final     Potassium 12/14/2022 4.1  3.4 - 5.3 mmol/L Final     Chloride 12/14/2022 104  98 - 107 mmol/L Final     Carbon Dioxide (CO2) 12/14/2022 26  22 - 29 mmol/L Final     Anion Gap 12/14/2022 12  7 - 15 mmol/L Final     Urea Nitrogen 12/14/2022 27.1 (H)  8.0 - 23.0 mg/dL Final     Creatinine 12/14/2022 1.03 (H)  0.51 - 0.95 mg/dL Final     Calcium 12/14/2022 9.3  8.6 - 10.0 mg/dL Final     Glucose 12/14/2022 80  70 - 99 mg/dL Final     Alkaline Phosphatase 12/14/2022 78  35 - 104 U/L Final     AST 12/14/2022 19  10 - 35 U/L Final     ALT 12/14/2022 10  10 - 35 U/L Final     Protein Total 12/14/2022 6.7  6.4 - 8.3 g/dL Final     Albumin 12/14/2022 4.2  3.5 - 5.2 g/dL Final     Bilirubin Total 12/14/2022 0.7  <=1.2 mg/dL Final     GFR Estimate 12/14/2022 62  >60 mL/min/1.73m2 Final     LVEF  12/14/2022 60-65%   Final     Other HR HPV 12/14/2022 Negative  Negative Final     HPV16 DNA 12/14/2022 Negative  Negative Final     HPV18 DNA 12/14/2022 Negative  Negative Final     FINAL DIAGNOSIS 12/14/2022    Final                    Value:This result contains rich text formatting which cannot be displayed here.     Hepatitis C RNA IU/mL 12/14/2022 Not Detected  Not Detected IU/mL Final   Office Visit on 11/23/2022   Component Date Value Ref Range Status     Ventricular Rate 11/23/2022 51  BPM Final     Atrial Rate 11/23/2022 51  BPM Final     IN Interval 11/23/2022 208  ms Final     QRS Duration 11/23/2022 142  ms Final     QT 11/23/2022 498  ms Final     QTc 11/23/2022 458   ms Final     P Axis 11/23/2022 60  degrees Final     R AXIS 11/23/2022 -38  degrees Final     T Axis 11/23/2022 41  degrees Final     Interpretation ECG 11/23/2022    Final                    Value:Sinus bradycardia  Left axis deviation  Right bundle branch block  Abnormal ECG  No previous ECGs available  Confirmed by MD ABHINAV, ROYA ([id number]) on 11/28/2022 4:53:51 PM            ASSESSMENT:       ICD-10-CM    1. Palpitations  R00.2       2. Tachycardia  R00.0       3. Abnormal electrocardiogram  R94.31 Adult Cardiology al [facility] Referral     EKG 12-lead, tracing only      4. Paroxysmal supraventricular tachycardia (H)  I47.1       5. History of cocaine abuse (H)  F14.11             PLAN:   1.  Palpitations: We were unable to find a rhythm strip which was recorded in the ambulance.  I suspect SVT but I cannot confirm this.  We reviewed her Zio patch which was deficient of A. fib.  She had x2 episodes of SVT lasting up to 8 beats.  Typically, her dysrhythmia lasts 10 to 20 minutes.  Being that they are infrequent, she will attempt Valsalva maneuvers.  If this fails and her dysrhythmia persist, it was suggested that she go to the ER.  We did discuss a 30-day monitor as well as a loop recorder.  For now, no changes made.  She will follow-up if she has reoccurring symptoms in the future  2.  Follow-up in future on as-needed basis      Thank you for allowing me to participate in the care of your patient. Please do not hesitate to contact me if you have any questions.     Eddie Marvin, DO

## 2023-01-12 ENCOUNTER — OFFICE VISIT (OUTPATIENT)
Dept: CARDIOLOGY | Facility: OTHER | Age: 58
End: 2023-01-12
Attending: INTERNAL MEDICINE
Payer: MEDICARE

## 2023-01-12 VITALS
HEART RATE: 44 BPM | TEMPERATURE: 97.3 F | RESPIRATION RATE: 16 BRPM | DIASTOLIC BLOOD PRESSURE: 100 MMHG | HEIGHT: 67 IN | BODY MASS INDEX: 29.35 KG/M2 | OXYGEN SATURATION: 100 % | SYSTOLIC BLOOD PRESSURE: 160 MMHG | WEIGHT: 187 LBS

## 2023-01-12 DIAGNOSIS — I47.10 PAROXYSMAL SUPRAVENTRICULAR TACHYCARDIA (H): ICD-10-CM

## 2023-01-12 DIAGNOSIS — R00.0 TACHYCARDIA: ICD-10-CM

## 2023-01-12 DIAGNOSIS — R94.31 ABNORMAL ELECTROCARDIOGRAM: ICD-10-CM

## 2023-01-12 DIAGNOSIS — F14.11 HISTORY OF COCAINE ABUSE (H): ICD-10-CM

## 2023-01-12 DIAGNOSIS — R00.2 PALPITATIONS: Primary | ICD-10-CM

## 2023-01-12 LAB
ATRIAL RATE - MUSE: 47 BPM
DIASTOLIC BLOOD PRESSURE - MUSE: NORMAL MMHG
INTERPRETATION ECG - MUSE: NORMAL
P AXIS - MUSE: 65 DEGREES
PR INTERVAL - MUSE: 228 MS
QRS DURATION - MUSE: 150 MS
QT - MUSE: 482 MS
QTC - MUSE: 426 MS
R AXIS - MUSE: -34 DEGREES
SYSTOLIC BLOOD PRESSURE - MUSE: NORMAL MMHG
T AXIS - MUSE: 36 DEGREES
VENTRICULAR RATE- MUSE: 47 BPM

## 2023-01-12 PROCEDURE — G0463 HOSPITAL OUTPT CLINIC VISIT: HCPCS | Mod: 25

## 2023-01-12 PROCEDURE — 99204 OFFICE O/P NEW MOD 45 MIN: CPT | Performed by: INTERNAL MEDICINE

## 2023-01-12 PROCEDURE — 93005 ELECTROCARDIOGRAM TRACING: CPT | Performed by: INTERNAL MEDICINE

## 2023-01-12 PROCEDURE — G0463 HOSPITAL OUTPT CLINIC VISIT: HCPCS

## 2023-01-12 PROCEDURE — 93010 ELECTROCARDIOGRAM REPORT: CPT | Performed by: INTERNAL MEDICINE

## 2023-01-12 RX ORDER — HYDROXYZINE HYDROCHLORIDE 50 MG/1
50 TABLET, FILM COATED ORAL 3 TIMES DAILY PRN
COMMUNITY
Start: 2022-12-29

## 2023-01-12 ASSESSMENT — PAIN SCALES - GENERAL: PAINLEVEL: SEVERE PAIN (7)

## 2023-01-12 NOTE — NURSING NOTE
"Patient comes in for consult for abnormal echo.  Nicolasa Baltazar LPN ....................1/12/2023   9:40 AM  Chief Complaint   Patient presents with     Consult For     Abnormal echo       Initial BP (!) 152/102 (BP Location: Right arm, Patient Position: Sitting, Cuff Size: Adult Regular)   Pulse (!) 44   Temp 97.3  F (36.3  C) (Temporal)   Resp 16   Ht 1.7 m (5' 6.93\")   Wt 84.8 kg (187 lb)   SpO2 100%   BMI 29.35 kg/m   Estimated body mass index is 29.35 kg/m  as calculated from the following:    Height as of this encounter: 1.7 m (5' 6.93\").    Weight as of this encounter: 84.8 kg (187 lb).  Meds Reconciled: complete  Pt is not on Aspirin  Pt is not on a Statin  PHQ and/or JUNAID reviewed. Pt referred to PCP/MH Provider as appropriate.    Nicolasa Baltazar LPN    FOOD SECURITY SCREENING QUESTIONS  Hunger Vital Signs:  Within the past 12 months we worried whether our food would run out before we got money to buy more. Never  Within the past 12 months the food we bought just didn't last and we didn't have money to get more. Never  Nicolasa Baltazar LPN 1/12/2023 9:40 AM    "

## 2023-01-31 ENCOUNTER — PATIENT OUTREACH (OUTPATIENT)
Dept: CARE COORDINATION | Facility: CLINIC | Age: 58
End: 2023-01-31
Payer: MEDICARE

## 2023-01-31 NOTE — TELEPHONE ENCOUNTER
Clinic Care Coordination Contact  Care Team Conversations    Called for followup and spoke with Dinorah, she reports the following;    She was able to get an appt with [facility] in [place] for medication management and reports she is back on her meds.     She is going to be starting therapy next week and reports that overall she is feeling better and managing her mental and physical health.    She is feeling better and reports that she no longer needs care coordination at this time      ETHAN Camacho on 1/31/2023 at 9:42 AM

## 2023-03-08 ENCOUNTER — TELEPHONE (OUTPATIENT)
Dept: INTERNAL MEDICINE | Facility: OTHER | Age: 58
End: 2023-03-08
Payer: MEDICARE

## 2023-03-08 DIAGNOSIS — Z72.51 HIGH RISK HETEROSEXUAL BEHAVIOR: Primary | ICD-10-CM

## 2023-03-08 NOTE — TELEPHONE ENCOUNTER
Call to patient, she states the [place] [facility] is requiring her to have a referral.  Pending referral. No need to call once completed.  Anne Arce RN on 3/8/2023 at 3:01 PM

## 2023-03-08 NOTE — TELEPHONE ENCOUNTER
Left message to call back....................  3/8/2023   1:29 PM  Anne Arce RN on 3/8/2023 at 1:29 PM

## 2023-03-08 NOTE — TELEPHONE ENCOUNTER
After verifying patient's name and date of birth, patient states she wants a referral to gyn   She is having issues with vaginal swelling and it seems to be getting worse.    Tanisha Zamarripa LPN....3/8/2023 12:59 PM

## 2023-03-08 NOTE — TELEPHONE ENCOUNTER
Reason for call: Request for a referral.    Referral requested for what concern?  Follow Up after PAP    Have you already been seen by the specialty you need the referral for?  No    If yes, Date:   ,  Location:   ,  Provider:       If no,  Where do you want to go?   [facility]    Additional comments:       Preferred method for responding to this message: Telephone Call    Phone number patient can be reached at? Cell number on file:    Telephone Information:   Mobile [phone]       If we can't reach you directly, may we leave a detailed response at the number you provided? Yes     Please call when in place

## 2023-03-21 ENCOUNTER — OFFICE VISIT (OUTPATIENT)
Dept: OBGYN | Facility: OTHER | Age: 58
End: 2023-03-21
Attending: OBSTETRICS & GYNECOLOGY
Payer: MEDICARE

## 2023-03-21 VITALS
SYSTOLIC BLOOD PRESSURE: 158 MMHG | WEIGHT: 197 LBS | RESPIRATION RATE: 18 BRPM | HEIGHT: 68 IN | HEART RATE: 55 BPM | DIASTOLIC BLOOD PRESSURE: 94 MMHG | BODY MASS INDEX: 29.86 KG/M2 | OXYGEN SATURATION: 98 %

## 2023-03-21 DIAGNOSIS — Z11.3 SCREEN FOR STD (SEXUALLY TRANSMITTED DISEASE): Primary | ICD-10-CM

## 2023-03-21 DIAGNOSIS — N95.2 ATROPHIC VAGINITIS: ICD-10-CM

## 2023-03-21 LAB
C TRACH DNA SPEC QL PROBE+SIG AMP: NEGATIVE
CLUE CELLS: ABNORMAL
N GONORRHOEA DNA SPEC QL NAA+PROBE: NEGATIVE
TRICHOMONAS, WET PREP: ABNORMAL
WBC'S/HIGH POWER FIELD, WET PREP: ABNORMAL
YEAST, WET PREP: ABNORMAL

## 2023-03-21 PROCEDURE — 87591 N.GONORRHOEAE DNA AMP PROB: CPT | Mod: ZL | Performed by: OBSTETRICS & GYNECOLOGY

## 2023-03-21 PROCEDURE — G0463 HOSPITAL OUTPT CLINIC VISIT: HCPCS

## 2023-03-21 PROCEDURE — 87210 SMEAR WET MOUNT SALINE/INK: CPT | Mod: ZL | Performed by: OBSTETRICS & GYNECOLOGY

## 2023-03-21 PROCEDURE — 99203 OFFICE O/P NEW LOW 30 MIN: CPT | Performed by: OBSTETRICS & GYNECOLOGY

## 2023-03-21 PROCEDURE — G0463 HOSPITAL OUTPT CLINIC VISIT: HCPCS | Mod: 25

## 2023-03-21 PROCEDURE — 87491 CHLMYD TRACH DNA AMP PROBE: CPT | Mod: ZL | Performed by: OBSTETRICS & GYNECOLOGY

## 2023-03-21 ASSESSMENT — PAIN SCALES - GENERAL: PAINLEVEL: NO PAIN (0)

## 2023-03-21 NOTE — PROGRESS NOTES
S:  Vaginal swelling/irritation  56 yo p1 (CS)  She describes feeling tender/swollen on inside of vagina.  Recent dyspareunia.  She was recently diagnosed with Chlamydia/Trichamonas and treated for each.  Previous parrter was treated for Chlamydia (But not trich) and the have had sexual interaction but not intercourse once since.    GYN history cryosurgery, genital warts many years ago, Paps nml since.  Recent Pap nml with neg HPV.     Denies abnormal vaginal discharge, vaginal bleeding, bulging, incontinences.  She does not some pelvic floor dysfunction with occasional splinting for BM.          Patient Active Problem List   Diagnosis     Bipolar I disorder (H)     Severe episode of recurrent major depressive disorder, with psychotic features (H)     Mixed obsessional thoughts and acts     Paroxysmal supraventricular tachycardia (H)     Cocaine abuse (H)     Behcet's disease (H)     Positive FIT (fecal immunochemical test)     History of Guillain-[place] syndrome     Hx of venereal warts     Hepatitis C antibody positive in blood     Palpitations     Tachycardia     Abnormal electrocardiogram     History of cocaine abuse (H)          No past medical history on file.       No past surgical history on file.         Social History     Tobacco Use     Smoking status: Never     Smokeless tobacco: Former   Substance Use Topics     Alcohol use: Not Currently          No family history on file.            Allergies   Allergen Reactions     Influenza Virus Vaccine [Flu Virus Vaccine]      Guillain-[place] syndrome     Hydrocodone Rash     possible            Current Outpatient Medications   Medication Sig Dispense Refill     buPROPion (WELLBUTRIN SR) 100 MG 12 hr tablet 1 tablet daily for 3 days then increase to twice daily 60 tablet 11     hydrOXYzine (ATARAX) 50 MG tablet Take 50 mg by mouth 3 times daily as needed       ibuprofen (ADVIL/MOTRIN) 600 MG tablet Take 600 mg by mouth 3 times daily       lamoTRIgine (LAMICTAL) 25  "MG tablet Take 25 mg by mouth At Bedtime       naltrexone (DEPADE/REVIA) 50 MG tablet Take 50 mg by mouth daily       topiramate (TOPAMAX) 25 MG tablet Take 25 mg by mouth 2 times daily       divalproex sodium extended-release (DEPAKOTE ER) 500 MG 24 hr tablet Take 1 tablet (500 mg) by mouth daily (Patient not taking: Reported on 12/14/2022) 30 tablet 11          Review Of Systems  Constitutional:  Denies fever  GI/ negative except as noted per hpi    O:   BP (!) 158/94 (BP Location: Left arm, Cuff Size: Adult Large)   Pulse 55   Resp 18   Ht 1.727 m (5' 8\")   Wt 89.4 kg (197 lb)   SpO2 98%   BMI 29.95 kg/m    Gen:  NAD, A and O    Abd soft, NT, ND  Lymphadenopathy:  neg  Vulva: No lesions, erythema, BUS wnl  Vagina: Pink, moist mucosa, mildly atrophic,no lesions  Cervix: No lesions, no CMT  Uterus: Midposition, normal size and count our, mobile, NT  Adnexa: Non-palpable, NT, no masses  Anus without lesions  Ext.  next       A/P:   Atrophic vaginitis, recent Chlamydia/trichamonas (treated) Repeat STD testing to confirm cure.    Discussed urogenital atrophy, recent infection,  pelvic floor dysfunction, treatment options (estrogen, pelvic floor PT) .  Will await testing results.  Reassured no evidence of prolapse.   Pt has my card and phone number to call as needed if problems in the interim or she does not hear her results.   30  minutes were spent on the patient visit in face-to-face counseling, review or records, charting,  and coordination of care.        "

## 2023-04-02 ENCOUNTER — HEALTH MAINTENANCE LETTER (OUTPATIENT)
Age: 58
End: 2023-04-02

## 2023-10-06 ENCOUNTER — TRANSFERRED RECORDS (OUTPATIENT)
Dept: HEALTH INFORMATION MANAGEMENT | Facility: CLINIC | Age: 58
End: 2023-10-06
Payer: MEDICARE

## 2023-10-16 ENCOUNTER — OFFICE VISIT (OUTPATIENT)
Dept: FAMILY MEDICINE | Facility: OTHER | Age: 58
End: 2023-10-16
Attending: PHYSICIAN ASSISTANT
Payer: MEDICARE

## 2023-10-16 VITALS
SYSTOLIC BLOOD PRESSURE: 138 MMHG | BODY MASS INDEX: 33.01 KG/M2 | OXYGEN SATURATION: 98 % | DIASTOLIC BLOOD PRESSURE: 86 MMHG | HEART RATE: 45 BPM | HEIGHT: 68 IN | TEMPERATURE: 98.6 F | RESPIRATION RATE: 14 BRPM | WEIGHT: 217.8 LBS

## 2023-10-16 DIAGNOSIS — R50.9 FEVER, UNSPECIFIED FEVER CAUSE: Primary | ICD-10-CM

## 2023-10-16 DIAGNOSIS — N30.00 ACUTE CYSTITIS WITHOUT HEMATURIA: ICD-10-CM

## 2023-10-16 PROBLEM — M17.0 PRIMARY OSTEOARTHRITIS OF BOTH KNEES: Status: ACTIVE | Noted: 2018-10-20

## 2023-10-16 LAB
ALBUMIN SERPL BCG-MCNC: 4.2 G/DL (ref 3.5–5.2)
ALBUMIN UR-MCNC: 20 MG/DL
ALP SERPL-CCNC: 79 U/L (ref 35–104)
ALT SERPL W P-5'-P-CCNC: 16 U/L (ref 0–50)
ANION GAP SERPL CALCULATED.3IONS-SCNC: 8 MMOL/L (ref 7–15)
APPEARANCE UR: ABNORMAL
AST SERPL W P-5'-P-CCNC: 20 U/L (ref 0–45)
BACTERIA #/AREA URNS HPF: ABNORMAL /HPF
BASO+EOS+MONOS # BLD AUTO: ABNORMAL 10*3/UL
BASO+EOS+MONOS NFR BLD AUTO: ABNORMAL %
BASOPHILS # BLD AUTO: 0.1 10E3/UL (ref 0–0.2)
BASOPHILS NFR BLD AUTO: 0 %
BILIRUB SERPL-MCNC: 0.9 MG/DL
BILIRUB UR QL STRIP: NEGATIVE
BUN SERPL-MCNC: 20.4 MG/DL (ref 8–23)
CALCIUM SERPL-MCNC: 9.5 MG/DL (ref 8.8–10.2)
CHLORIDE SERPL-SCNC: 107 MMOL/L (ref 98–107)
COLOR UR AUTO: YELLOW
CREAT SERPL-MCNC: 0.97 MG/DL (ref 0.51–0.95)
DEPRECATED HCO3 PLAS-SCNC: 26 MMOL/L (ref 22–29)
EGFRCR SERPLBLD CKD-EPI 2021: 67 ML/MIN/1.73M2
EOSINOPHIL # BLD AUTO: 0.1 10E3/UL (ref 0–0.7)
EOSINOPHIL NFR BLD AUTO: 1 %
ERYTHROCYTE [DISTWIDTH] IN BLOOD BY AUTOMATED COUNT: 13.5 % (ref 10–15)
FLUAV RNA SPEC QL NAA+PROBE: NEGATIVE
FLUBV RNA RESP QL NAA+PROBE: NEGATIVE
GLUCOSE SERPL-MCNC: 97 MG/DL (ref 70–99)
GLUCOSE UR STRIP-MCNC: NEGATIVE MG/DL
HCT VFR BLD AUTO: 43.9 % (ref 35–47)
HGB BLD-MCNC: 14.9 G/DL (ref 11.7–15.7)
HGB UR QL STRIP: NEGATIVE
HYALINE CASTS: 3 /LPF
IMM GRANULOCYTES # BLD: 0.1 10E3/UL
IMM GRANULOCYTES NFR BLD: 1 %
KETONES UR STRIP-MCNC: NEGATIVE MG/DL
LEUKOCYTE ESTERASE UR QL STRIP: ABNORMAL
LYMPHOCYTES # BLD AUTO: 2.2 10E3/UL (ref 0.8–5.3)
LYMPHOCYTES NFR BLD AUTO: 18 %
MCH RBC QN AUTO: 29.9 PG (ref 26.5–33)
MCHC RBC AUTO-ENTMCNC: 33.9 G/DL (ref 31.5–36.5)
MCV RBC AUTO: 88 FL (ref 78–100)
MONOCYTES # BLD AUTO: 0.7 10E3/UL (ref 0–1.3)
MONOCYTES NFR BLD AUTO: 6 %
MUCOUS THREADS #/AREA URNS LPF: PRESENT /LPF
NEUTROPHILS # BLD AUTO: 9.4 10E3/UL (ref 1.6–8.3)
NEUTROPHILS NFR BLD AUTO: 74 %
NITRATE UR QL: POSITIVE
NRBC # BLD AUTO: 0 10E3/UL
NRBC BLD AUTO-RTO: 0 /100
PH UR STRIP: 6 [PH] (ref 5–9)
PLATELET # BLD AUTO: 181 10E3/UL (ref 150–450)
POTASSIUM SERPL-SCNC: 4 MMOL/L (ref 3.4–5.3)
PROT SERPL-MCNC: 6.9 G/DL (ref 6.4–8.3)
RBC # BLD AUTO: 4.98 10E6/UL (ref 3.8–5.2)
RBC URINE: 1 /HPF
RSV RNA SPEC NAA+PROBE: NEGATIVE
SARS-COV-2 RNA RESP QL NAA+PROBE: NEGATIVE
SODIUM SERPL-SCNC: 141 MMOL/L (ref 135–145)
SP GR UR STRIP: 1.02 (ref 1–1.03)
SQUAMOUS EPITHELIAL: 32 /HPF
UROBILINOGEN UR STRIP-MCNC: NORMAL MG/DL
WBC # BLD AUTO: 12.5 10E3/UL (ref 4–11)
WBC URINE: 9 /HPF

## 2023-10-16 PROCEDURE — 87186 SC STD MICRODIL/AGAR DIL: CPT | Mod: ZL | Performed by: PHYSICIAN ASSISTANT

## 2023-10-16 PROCEDURE — 85004 AUTOMATED DIFF WBC COUNT: CPT | Mod: ZL | Performed by: PHYSICIAN ASSISTANT

## 2023-10-16 PROCEDURE — 81001 URINALYSIS AUTO W/SCOPE: CPT | Mod: ZL | Performed by: PHYSICIAN ASSISTANT

## 2023-10-16 PROCEDURE — G0463 HOSPITAL OUTPT CLINIC VISIT: HCPCS

## 2023-10-16 PROCEDURE — 84520 ASSAY OF UREA NITROGEN: CPT | Mod: ZL | Performed by: PHYSICIAN ASSISTANT

## 2023-10-16 PROCEDURE — 87637 SARSCOV2&INF A&B&RSV AMP PRB: CPT | Mod: ZL | Performed by: PHYSICIAN ASSISTANT

## 2023-10-16 PROCEDURE — 36415 COLL VENOUS BLD VENIPUNCTURE: CPT | Mod: ZL | Performed by: PHYSICIAN ASSISTANT

## 2023-10-16 PROCEDURE — 99213 OFFICE O/P EST LOW 20 MIN: CPT | Performed by: PHYSICIAN ASSISTANT

## 2023-10-16 PROCEDURE — C9803 HOPD COVID-19 SPEC COLLECT: HCPCS | Performed by: PHYSICIAN ASSISTANT

## 2023-10-16 PROCEDURE — 87040 BLOOD CULTURE FOR BACTERIA: CPT | Mod: ZL | Performed by: PHYSICIAN ASSISTANT

## 2023-10-16 RX ORDER — SULFAMETHOXAZOLE/TRIMETHOPRIM 800-160 MG
1 TABLET ORAL 2 TIMES DAILY
Qty: 14 TABLET | Refills: 0 | Status: SHIPPED | OUTPATIENT
Start: 2023-10-16 | End: 2023-10-25

## 2023-10-16 ASSESSMENT — PATIENT HEALTH QUESTIONNAIRE - PHQ9
SUM OF ALL RESPONSES TO PHQ QUESTIONS 1-9: 19
SUM OF ALL RESPONSES TO PHQ QUESTIONS 1-9: 19
10. IF YOU CHECKED OFF ANY PROBLEMS, HOW DIFFICULT HAVE THESE PROBLEMS MADE IT FOR YOU TO DO YOUR WORK, TAKE CARE OF THINGS AT HOME, OR GET ALONG WITH OTHER PEOPLE: VERY DIFFICULT

## 2023-10-16 ASSESSMENT — PAIN SCALES - GENERAL: PAINLEVEL: NO PAIN (0)

## 2023-10-16 NOTE — NURSING NOTE
Patient presents to clinic to discuss elevated WBC.  Ashly Otoole LPN ....................  10/16/2023   9:42 AM  EXT [id number]

## 2023-10-16 NOTE — PROGRESS NOTES
Assessment & Plan     1. Fever, unspecified fever cause  Differential is large given very vague symptoms, includes viral infection, COVID, influenza, RSV, UTI, vaginal infection, abdominal infection, stress related, etc.  Reviewed lab work from ED visit from 10/6 with elevated white count, elevated neutrophils. Mildly low renal function. Given new onset of vague symptoms will start evaluation with viral swab, urine, and labs as below. Vitals and exam unremarkable. Will notify with results and discuss if treatment is indicated at that time.   - CBC and Differential; Future  - Comprehensive Metabolic Panel; Future  - Blood Culture Peripheral Blood; Future  - UA Macroscopic with reflex to Microscopic and Culture  - Symptomatic Influenza A/B, RSV, & SARS-CoV2 PCR (COVID-19) Nasopharyngeal      No follow-ups on file.    Kamla Paulino PA-C  [facility]    ADDENDUM:  UA returned positive for UTI, UC pending. Prescription for antibiotic sent. WBC improving down to 12.5. Additional results pending.   Kamla Paulino PA-C on 10/16/2023 at 10:53 AM      Barby Rashid is a 58 year old, presenting for the following health issues:  Results      History of Present Illness       Reason for visit:  Feeling ill  Symptom onset:  1-2 weeks ago  Symptom intensity:  Severe  Symptom progression:  Worsening  Had these symptoms before:  Yes  Has tried/received treatment for these symptoms:  Yes  Previous treatment was successful:  Yes  Prior treatment description:  Bbnn  What makes it worse:  \[[[  What makes it better:  KkkShe consumes 0 sweetened beverage(s) daily.   She is taking medications regularly.    Here for follow up on elevated WBC and not feeling well.  Seen in the ED in the [facility] last week for SVT.  Reports her count was initially noted to be elevated at 22.3, down to 17.1 on recheck.  Patient had reported she was otherwise at her baseline so no additional evaluation was completed at  "that time.  Since then patient has been fatigued, dealing with sweats, fever, more lethargic x3 days.  She reports she was around her friend who had a MRSA infection, subsequent sepsis after limb surgery.  Patient is concerned she could also have infection.  No cough, shortness of breath, wheezing, nausea, vomiting, diarrhea, constipation, urinary frequency urgency, dysuria, hematuria, flank pain, vaginal symptoms.  Eating and drinking okay.  No other known sick contacts. Noting some generalized itching without rash.       PAST MEDICAL HISTORY: No past medical history on file.    PAST SURGICAL HISTORY: No past surgical history on file.    FAMILY HISTORY: No family history on file.    SOCIAL HISTORY:   Social History     Tobacco Use    Smoking status: Never    Smokeless tobacco: Former   Substance Use Topics    Alcohol use: Not Currently        Allergies   Allergen Reactions    Influenza Virus Vaccine [Influenza Virus Vaccine]      Guillain-[place] syndrome    Hydrocodone Rash     possible     Current Outpatient Medications   Medication    buPROPion (WELLBUTRIN SR) 100 MG 12 hr tablet    hydrOXYzine (ATARAX) 50 MG tablet    ibuprofen (ADVIL/MOTRIN) 600 MG tablet    lamoTRIgine (LAMICTAL) 25 MG tablet    naltrexone (DEPADE/REVIA) 50 MG tablet    topiramate (TOPAMAX) 25 MG tablet    divalproex sodium extended-release (DEPAKOTE ER) 500 MG 24 hr tablet     No current facility-administered medications for this visit.         Review of Systems   Per HPI        Objective    /86   Pulse (!) 45   Temp 98.6  F (37  C)   Resp 14   Ht 1.727 m (5' 8\")   Wt 98.8 kg (217 lb 12.8 oz)   SpO2 98%   BMI 33.12 kg/m    Body mass index is 33.12 kg/m .  Physical Exam   General: Pleasant, in no apparent distress.  Eyes: Sclera are white and conjunctiva are clear bilaterally. Lacrimal apparatus free of erythema, edema, and discharge bilaterally.  Ears: External ears without erythema or edema. Tympanic membranes are pearly white " and without erythema, scarring or perforations bilaterally. External auditory canals are free of foreign bodies, erythema, ulcers, and masses.  Nose: External nose is symmetrical and free of lesions and deformities.   Oropharynx: Oral mucosa is pink and without ulcers, nodules, and white patches. Tongue is symmetrical, pink, and without masses or lesions. Pharynx is pink, symmetrical, and without lesions. Uvula is midline. Tonsils are pink, symmetrical, and without edema, ulcers, or exudates, and 1+ bilaterally.  Neck: No cervical lymphadenopathy on inspection and palpation.  Cardiovascular: Regular rate and rhythm with S1 equal to S2. No murmurs, friction rubs, or gallops.   Respiratory: Lungs are resonant and clear to auscultation bilaterally. No wheezes, crackles, or rhonchi.  Abdomen: Abdomen is non-distended. Active bowel sounds heard in all four quadrants. No tenderness to palpation in all four quadrants. No rebound tenderness or guarding. No palpable masses noted. No hepatosplenomegaly.  Skin: No jaundice, pallor, rashes, or lesions on exposed skin.   Psych: Appropriate mood and affect.

## 2023-10-18 LAB — BACTERIA UR CULT: ABNORMAL

## 2023-10-18 NOTE — PROGRESS NOTES
"[facility] HEART CARE   CARDIOLOGY PROGRESS NOTE     Chief Complaint   Patient presents with    Follow Up     ER visit          Diagnosis:  1.  Palpitations/tachycardia.  SVT.    2.  CHF.  Diastolic grade 2, 12/14/2022.  3.  PSVT.  On EKG from 10/6/2023, [facility].  Episodes typically last 10-20 min's.  4.  History of cocaine abuse.  5.  CKD-3.  6.  Elevated troponin on 10/6/2023.  Peaking at 51.  Type II from elevated heart rate/SVT to 160 beats  7.  UTI on 10/16/2023.  8.  Bipolar.  9.  Cardioversion-history of x1.      Assessment/Plan:    1.  SVT: Went to [facility] on 10/6/2023 after having extended runs of palpitations.  Woke up in the middle night with symptoms and they persisted the next morning.  She went to the local fire station who documented a heart rate of 160s.  They wanted to take her by ambulance but patient declined.  She drove herself to the ER.  Typically her heart rate is between 160-180.  Never had syncope.  When she got to the ER, they were planning on giving adenosine but she self converted.  She believes the episode lasted 9-10 hours.  EKG shows wide QRS tachycardia with a heart rate of 160 bpm.  They were going to give adenosine but she self converted.  Since last being seen on 1/12/2023, she believes she has had 5-6 episodes.  She believes 5 or less lasted less than 5 minutes.  Additional 3 were up to 2 hours.  She just feels \"funny\".  She does not feel her heart racing but knows when she is in it.  Suggest another Zio patch and follow-up with the EP thereafter with concerns for SVT, more specifically AVNRT.  It may be that she would benefit from EP study/ablation with her extended runs.  I cannot escalate therapy too much as her heart rates are in the 40s and 50s.  2.  Follow-up will be contingent on evaluation by EP.      Interval history:  See above.      HPI:    Mrs. Romero is being seen by cardiology for concerns for dysrhythmia.  She had been incarcerated and while in [facility], she started having " palpitations.  She has had these episodes in the past but this is the longest episode.  Because her symptoms persisted, she was brought to the ER.     Catheter describes 6 episodes of rather intense palpitations.  They typically last 10 to 20 minutes.  She has a history of significant cocaine abuse.  She used heavily for 4 years.  She was incarcerated when she started having x1 of these episodes.  Her heart was racing but she expected to dissipate.  After 2 hours of her heart racing, she became concerned.  This episode lasted for hours which is the longest episode she has had.  Apparently, she looked poorly and was asked if she was feeling well.     On her way to the hospital in the ambulance, she reports having had a fast heart rate.  She was given a medication which seemed to improve the situation.  We have not been able to find that rhythm strip.  She was appropriately set up for an echocardiogram and Zio patch.  She is here for those results.     We reviewed her Zio patch and echocardiogram as outlined above.  She was found to have normal EF but HFpEF grade 2     Zio patch showed x2 episodes of SVT lasting up to 8 beats.  No A. fib, heart block, pauses or V. tach.      Relevant testing:  Echo on 12/14/2022:   Global and regional left ventricular function is normal with an EF of 60-65%.  Grade II or moderate diastolic dysfunction. Diastolic Doppler findings (E/E' ratio and/or other parameters) suggest left ventricular filling pressures are increased.  Global right ventricular size and function are normal.  No significant valvular abnormalities.  IVC diameter <2.1 cm collapsing >50% with sniff suggests a normal RA pressure of 3 mmHg.  No pericardial effusion is present.  There is no prior study for direct comparison.     Zio patch in 12/14/2022:  Indication: Abnormal EKG  Analysis Time 13 days, 20 hours of non-artifact time -from December 14, 2022 through December 20, 2022  Maximum  bpm, Minimum HR 36 bpm,  average 54 bpm.  1 triggered event - findings: Short series of preatrial contractions in a bigeminy  pattern  Predominant underlying rhythm was sinus rhythm.   Isolated SVE's, SVE couplets and SVE triplets were rare.  Isolated VE's, VE couplets were rare, no VE triplets found.        ICD-10-CM    1. Palpitations  R00.2 Leadless EKG Monitor 8 to 14 Days     EKG 12-lead, tracing only      2. Paroxysmal supraventricular tachycardia  I47.10 Leadless EKG Monitor 8 to 14 Days      3. Tachycardia  R00.0 Leadless EKG Monitor 8 to 14 Days      4. Abnormal electrocardiogram  R94.31           No past medical history on file.    No past surgical history on file.    Allergies   Allergen Reactions    Influenza Virus Vaccine [Influenza Virus Vaccine]      Guillain-Henrietta syndrome    Hydrocodone Rash     possible       Current Outpatient Medications   Medication Sig Dispense Refill    buPROPion (WELLBUTRIN XL) 300 MG 24 hr tablet Take 1 tablet by mouth daily at 2 pm      hydrOXYzine (ATARAX) 50 MG tablet Take 50 mg by mouth 3 times daily as needed      ibuprofen (ADVIL/MOTRIN) 600 MG tablet Take 600 mg by mouth 3 times daily      lamoTRIgine (LAMICTAL) 25 MG tablet Take 25 mg by mouth At Bedtime      naltrexone (DEPADE/REVIA) 50 MG tablet Take 50 mg by mouth daily      QUEtiapine (SEROQUEL) 100 MG tablet Take 100 mg by mouth      sulfamethoxazole-trimethoprim (BACTRIM DS) 800-160 MG tablet Take 1 tablet by mouth 2 times daily 14 tablet 0    topiramate (TOPAMAX) 25 MG tablet Take 25 mg by mouth 2 times daily      VRAYLAR 1.5 MG capsule       buPROPion (WELLBUTRIN SR) 100 MG 12 hr tablet 1 tablet daily for 3 days then increase to twice daily (Patient not taking: Reported on 10/19/2023) 60 tablet 11    divalproex sodium extended-release (DEPAKOTE ER) 500 MG 24 hr tablet Take 1 tablet (500 mg) by mouth daily (Patient not taking: Reported on 12/14/2022) 30 tablet 11       Social History     Socioeconomic History    Marital status: Single  "    Spouse name: Not on file    Number of children: Not on file    Years of education: Not on file    Highest education level: Not on file   Occupational History    Not on file   Tobacco Use    Smoking status: Every Day     Types: Cigarettes    Smokeless tobacco: Former   Vaping Use    Vaping Use: Not on file   Substance and Sexual Activity    Alcohol use: Not Currently    Drug use: Not Currently     Types: \"Crack\" cocaine    Sexual activity: Not Currently   Other Topics Concern    Not on file   Social History Narrative    Not on file     Social Determinants of Health     Financial Resource Strain: Unknown (10/16/2023)    Financial Resource Strain     Within the past 12 months, have you or your family members you live with been unable to get utilities (heat, electricity) when it was really needed?: Patient refused   Food Insecurity: Low Risk  (10/16/2023)    Food Insecurity     Within the past 12 months, did you worry that your food would run out before you got money to buy more?: No     Within the past 12 months, did the food you bought just not last and you didn t have money to get more?: No   Transportation Needs: Unknown (10/16/2023)    Transportation Needs     Within the past 12 months, has lack of transportation kept you from medical appointments, getting your medicines, non-medical meetings or appointments, work, or from getting things that you need?: Patient refused   Physical Activity: Not on file   Stress: Not on file   Social Connections: Not on file   Interpersonal Safety: Low Risk  (10/19/2023)    Interpersonal Safety     Do you feel physically and emotionally safe where you currently live?: Yes     Within the past 12 months, have you been hit, slapped, kicked or otherwise physically hurt by someone?: No     Within the past 12 months, have you been humiliated or emotionally abused in other ways by your partner or ex-partner?: No   Housing Stability: Low Risk  (10/16/2023)    Housing Stability     Do you " have housing? : Yes     Are you worried about losing your housing?: No       LAB RESULTS:   Office Visit on 10/16/2023   Component Date Value Ref Range Status    Color Urine 10/16/2023 Yellow  Colorless, Straw, Light Yellow, Yellow Final    Appearance Urine 10/16/2023 Slightly Cloudy (A)  Clear Final    Glucose Urine 10/16/2023 Negative  Negative mg/dL Final    Bilirubin Urine 10/16/2023 Negative  Negative Final    Ketones Urine 10/16/2023 Negative  Negative mg/dL Final    Specific Gravity Urine 10/16/2023 1.024  1.000 - 1.030 Final    Blood Urine 10/16/2023 Negative  Negative Final    pH Urine 10/16/2023 6.0  5.0 - 9.0 Final    Protein Albumin Urine 10/16/2023 20 (A)  Negative mg/dL Final    Urobilinogen Urine 10/16/2023 Normal  Normal, 2.0 mg/dL Final    Nitrite Urine 10/16/2023 Positive (A)  Negative Final    Leukocyte Esterase Urine 10/16/2023 Moderate (A)  Negative Final    Bacteria Urine 10/16/2023 Few (A)  None Seen /HPF Final    Mucus Urine 10/16/2023 Present (A)  None Seen /LPF Final    RBC Urine 10/16/2023 1  <=2 /HPF Final    WBC Urine 10/16/2023 9 (H)  <=5 /HPF Final    Squamous Epithelials Urine 10/16/2023 32 (H)  <=1 /HPF Final    Hyaline Casts Urine 10/16/2023 3 (H)  <=2 /LPF Final    Influenza A PCR 10/16/2023 Negative  Negative Final    Influenza B PCR 10/16/2023 Negative  Negative Final    RSV PCR 10/16/2023 Negative  Negative Final    SARS CoV2 PCR 10/16/2023 Negative  Negative Final    Sodium 10/16/2023 141  135 - 145 mmol/L Final    Potassium 10/16/2023 4.0  3.4 - 5.3 mmol/L Final    Carbon Dioxide (CO2) 10/16/2023 26  22 - 29 mmol/L Final    Anion Gap 10/16/2023 8  7 - 15 mmol/L Final    Urea Nitrogen 10/16/2023 20.4  8.0 - 23.0 mg/dL Final    Creatinine 10/16/2023 0.97 (H)  0.51 - 0.95 mg/dL Final    GFR Estimate 10/16/2023 67  >60 mL/min/1.73m2 Final    Calcium 10/16/2023 9.5  8.8 - 10.2 mg/dL Final    Chloride 10/16/2023 107  98 - 107 mmol/L Final    Glucose 10/16/2023 97  70 - 99 mg/dL  "Final    Alkaline Phosphatase 10/16/2023 79  35 - 104 U/L Final    AST 10/16/2023 20  0 - 45 U/L Final    ALT 10/16/2023 16  0 - 50 U/L Final    Protein Total 10/16/2023 6.9  6.4 - 8.3 g/dL Final    Albumin 10/16/2023 4.2  3.5 - 5.2 g/dL Final    Bilirubin Total 10/16/2023 0.9  <=1.2 mg/dL Final    Culture 10/16/2023 No growth after 1 day   Preliminary    WBC Count 10/16/2023 12.5 (H)  4.0 - 11.0 10e3/uL Final    RBC Count 10/16/2023 4.98  3.80 - 5.20 10e6/uL Final    Hemoglobin 10/16/2023 14.9  11.7 - 15.7 g/dL Final    Hematocrit 10/16/2023 43.9  35.0 - 47.0 % Final    MCV 10/16/2023 88  78 - 100 fL Final    MCH 10/16/2023 29.9  26.5 - 33.0 pg Final    MCHC 10/16/2023 33.9  31.5 - 36.5 g/dL Final    RDW 10/16/2023 13.5  10.0 - 15.0 % Final    Platelet Count 10/16/2023 181  150 - 450 10e3/uL Final    % Neutrophils 10/16/2023 74  % Final    % Lymphocytes 10/16/2023 18  % Final    % Monocytes 10/16/2023 6  % Final    % Eosinophils 10/16/2023 1  % Final    % Basophils 10/16/2023 0  % Final    % Immature Granulocytes 10/16/2023 1  % Final    NRBCs per 100 WBC 10/16/2023 0  <1 /100 Final    Absolute Neutrophils 10/16/2023 9.4 (H)  1.6 - 8.3 10e3/uL Final    Absolute Lymphocytes 10/16/2023 2.2  0.8 - 5.3 10e3/uL Final    Absolute Monocytes 10/16/2023 0.7  0.0 - 1.3 10e3/uL Final    Absolute Eosinophils 10/16/2023 0.1  0.0 - 0.7 10e3/uL Final    Absolute Basophils 10/16/2023 0.1  0.0 - 0.2 10e3/uL Final    Absolute Immature Granulocytes 10/16/2023 0.1  <=0.4 10e3/uL Final    Absolute NRBCs 10/16/2023 0.0  10e3/uL Final    Culture 10/16/2023 >100,000 CFU/mL Gram negative bacilli (A)   Preliminary        Review of systems: Negative except that which was noted in the HPI.    Physical examination:  /70 (BP Location: Right arm, Patient Position: Sitting, Cuff Size: Adult Large)   Pulse 52   Temp 97  F (36.1  C) (Temporal)   Resp 16   Ht 1.72 m (5' 7.72\")   Wt 100.2 kg (221 lb)   SpO2 98%   BMI 33.88 kg/m  "     GENERAL APPEARANCE: healthy, alert and no distress  CHEST: lungs clear to auscultation - no rales, rhonchi or wheezes, no use of accessory muscles, no retractions, respirations are unlabored, normal respiratory rate  CARDIOVASCULAR: regular rhythm, normal S1 with physiologic split S2, no S3 or S4 and no murmur, click or rub  EXTREMITIES: no clubbing, cyanosis or edema.    Total time spent on day of visit, including review of tests, obtaining/reviewing separately obtained history, ordering medications/tests/procedures, communicating with PCP/consultants, and documenting in electronic medical record: 21 minutes.              Thank you for allowing me to participate in the care of your patient. Please do not hesitate to contact me if you have any questions.     Eddie Marvin, DO

## 2023-10-19 ENCOUNTER — MYC MEDICAL ADVICE (OUTPATIENT)
Dept: FAMILY MEDICINE | Facility: OTHER | Age: 58
End: 2023-10-19
Payer: MEDICARE

## 2023-10-19 ENCOUNTER — OFFICE VISIT (OUTPATIENT)
Dept: CARDIOLOGY | Facility: OTHER | Age: 58
End: 2023-10-19
Attending: INTERNAL MEDICINE
Payer: MEDICARE

## 2023-10-19 VITALS
BODY MASS INDEX: 33.49 KG/M2 | WEIGHT: 221 LBS | OXYGEN SATURATION: 98 % | HEART RATE: 52 BPM | TEMPERATURE: 97 F | SYSTOLIC BLOOD PRESSURE: 122 MMHG | DIASTOLIC BLOOD PRESSURE: 70 MMHG | RESPIRATION RATE: 16 BRPM | HEIGHT: 68 IN

## 2023-10-19 DIAGNOSIS — I47.10 PAROXYSMAL SUPRAVENTRICULAR TACHYCARDIA (H): ICD-10-CM

## 2023-10-19 DIAGNOSIS — R00.2 PALPITATIONS: Primary | ICD-10-CM

## 2023-10-19 DIAGNOSIS — R00.0 TACHYCARDIA: ICD-10-CM

## 2023-10-19 DIAGNOSIS — R94.31 ABNORMAL ELECTROCARDIOGRAM: ICD-10-CM

## 2023-10-19 LAB
ATRIAL RATE - MUSE: 49 BPM
DIASTOLIC BLOOD PRESSURE - MUSE: NORMAL MMHG
INTERPRETATION ECG - MUSE: NORMAL
P AXIS - MUSE: 60 DEGREES
PR INTERVAL - MUSE: 212 MS
QRS DURATION - MUSE: 144 MS
QT - MUSE: 484 MS
QTC - MUSE: 437 MS
R AXIS - MUSE: -42 DEGREES
SYSTOLIC BLOOD PRESSURE - MUSE: NORMAL MMHG
T AXIS - MUSE: 18 DEGREES
VENTRICULAR RATE- MUSE: 49 BPM

## 2023-10-19 PROCEDURE — 93010 ELECTROCARDIOGRAM REPORT: CPT | Performed by: INTERNAL MEDICINE

## 2023-10-19 PROCEDURE — 99213 OFFICE O/P EST LOW 20 MIN: CPT | Performed by: INTERNAL MEDICINE

## 2023-10-19 PROCEDURE — G0463 HOSPITAL OUTPT CLINIC VISIT: HCPCS | Mod: 25

## 2023-10-19 PROCEDURE — 93005 ELECTROCARDIOGRAM TRACING: CPT | Performed by: INTERNAL MEDICINE

## 2023-10-19 RX ORDER — BUPROPION HYDROCHLORIDE 300 MG/1
1 TABLET ORAL
COMMUNITY
Start: 2023-07-13

## 2023-10-19 RX ORDER — QUETIAPINE FUMARATE 100 MG/1
100 TABLET, FILM COATED ORAL
COMMUNITY
Start: 2023-08-15

## 2023-10-19 RX ORDER — CARIPRAZINE 1.5 MG/1
CAPSULE, GELATIN COATED ORAL
COMMUNITY
Start: 2023-10-17

## 2023-10-19 ASSESSMENT — PAIN SCALES - GENERAL: PAINLEVEL: NO PAIN (0)

## 2023-10-19 NOTE — NURSING NOTE
"Patient comes in for follow up after ER visit.    Chief Complaint   Patient presents with    Follow Up     ER visit       Initial /70 (BP Location: Right arm, Patient Position: Sitting, Cuff Size: Adult Large)   Pulse 52   Temp 97  F (36.1  C) (Temporal)   Resp 16   Ht 1.72 m (5' 7.72\")   Wt 100.2 kg (221 lb)   SpO2 98%   BMI 33.88 kg/m   Estimated body mass index is 33.88 kg/m  as calculated from the following:    Height as of this encounter: 1.72 m (5' 7.72\").    Weight as of this encounter: 100.2 kg (221 lb).  Meds Reconciled: complete  Pt is not on Aspirin  Pt is not on a Statin  PHQ and/or JUNAID reviewed. Pt referred to PCP/MH Provider as appropriate.    Nicolasa Baltazar LPN      "

## 2023-10-19 NOTE — TELEPHONE ENCOUNTER
Routing comment to Kamla Paulino:    She's on day 4 of Bactrim DS ordered 10/16 for acute cystitis (7 day course).  Comes in today to see cards at 4:15.  [facility] is pharmacy.  Tell her to complete course and head to [facility] over weekend if not improved?  Melissa MEEKS, RN ext [phone]       Routing comment from Kamal Paulino:      Yusigifredo, I agree with your recommendations.    Thanks!  Kamla Paulino PA-C on 10/19/2023 at 12:34 PM       Patient update on MyChart.    Melissa Hall, RN on 10/19/2023 at 2:03 PM

## 2023-10-21 LAB — BACTERIA BLD CULT: NO GROWTH

## 2023-10-23 ENCOUNTER — TELEPHONE (OUTPATIENT)
Dept: FAMILY MEDICINE | Facility: OTHER | Age: 58
End: 2023-10-23

## 2023-10-23 NOTE — TELEPHONE ENCOUNTER
Ok to use 10:00am same day on 10/25 otherwise she can follow up in the [facility] before or after her cardiology appointment.   Kamla Paulino PA-C on 10/23/2023 at 11:38 AM

## 2023-10-23 NOTE — TELEPHONE ENCOUNTER
After verifying last name and date of birth, Talked with patient and she stated today she is feeling a little, yesterday she was feeling very weak with some shortness of breath with activity. Urine is dark colored. Patient is also concerned about a infection as she is having discharge, would like screening for STD and bacterial infection. She has an appointment on Wednesday 10/25 cardio at 12:30 and she was wondering if she was able to get in with you before or after that time.     Nevin Velázquez LPN on 10/23/2023 at 11:31 AM

## 2023-10-23 NOTE — PROGRESS NOTES
Assessment & Plan     1. Acute cystitis without hematuria  Repeat urine is clear. Follow up as needed.   - UA Macroscopic with reflex to Microscopic and Culture  - CBC and Differential; Future  - CBC and Differential    2. Screen for STD (sexually transmitted disease)  - GC/Chlamydia by PCR    3. Lipid screening  - Lipid Panel; Future  - Lipid Panel        No follow-ups on file.    Kamla Paulino PA-C  [facility]    Barby Rashid is a 58 year old, presenting for the following health issues:  Bladder Problems (Follow up UTI   WBC check  STD check also)      HPI   Here for follow up on UTI.  Patient was seen in the clinic on 10/16 for fever and generally not feeling well.  UA/UC was positive for infection.  She was treated with 7-day course of Bactrim.  She has since feeling well.  She would like repeat urine to rule out residual infection.  She would also like follow-up to ensure her white blood cell count has improved.  She would like STD screening be completed.  Is due for lipid panel check.    PAST MEDICAL HISTORY: No past medical history on file.    PAST SURGICAL HISTORY: No past surgical history on file.    FAMILY HISTORY: No family history on file.    SOCIAL HISTORY:   Social History     Tobacco Use    Smoking status: Every Day     Types: Cigarettes    Smokeless tobacco: Former   Substance Use Topics    Alcohol use: Not Currently        Allergies   Allergen Reactions    Influenza Virus Vaccine [Influenza Virus Vaccine]      Guillain-Ishpeming syndrome    Hydrocodone Rash     possible     Current Outpatient Medications   Medication    buPROPion (WELLBUTRIN XL) 300 MG 24 hr tablet    hydrOXYzine (ATARAX) 50 MG tablet    ibuprofen (ADVIL/MOTRIN) 600 MG tablet    lamoTRIgine (LAMICTAL) 25 MG tablet    naltrexone (DEPADE/REVIA) 50 MG tablet    QUEtiapine (SEROQUEL) 100 MG tablet    sulfamethoxazole-trimethoprim (BACTRIM DS) 800-160 MG tablet    topiramate (TOPAMAX) 25 MG tablet     "VRAYLAR 1.5 MG capsule    buPROPion (WELLBUTRIN SR) 100 MG 12 hr tablet    divalproex sodium extended-release (DEPAKOTE ER) 500 MG 24 hr tablet     No current facility-administered medications for this visit.         Review of Systems   Per HPI        Objective    /88 (BP Location: Right arm, Patient Position: Sitting, Cuff Size: Adult Regular)   Pulse 54   Temp 98.4  F (36.9  C) (Temporal)   Resp 18   Ht 1.727 m (5' 8\")   Wt 99.3 kg (219 lb)   SpO2 99%   Breastfeeding No   BMI 33.30 kg/m    Body mass index is 33.3 kg/m .  Physical Exam   General: Pleasant, in no apparent distress.  Skin: No jaundice, pallor, rashes, or lesions on exposed skin.   Psych: Appropriate mood and affect.    Results for orders placed or performed in visit on 10/25/23   UA Macroscopic with reflex to Microscopic and Culture     Status: Normal    Specimen: Urine, Midstream   Result Value Ref Range    Color Urine Yellow Colorless, Straw, Light Yellow, Yellow    Appearance Urine Clear Clear    Glucose Urine Negative Negative mg/dL    Bilirubin Urine Negative Negative    Ketones Urine Negative Negative mg/dL    Specific Gravity Urine 1.022 1.000 - 1.030    Blood Urine Negative Negative    pH Urine 6.5 5.0 - 9.0    Protein Albumin Urine Negative Negative mg/dL    Urobilinogen Urine Normal Normal, 2.0 mg/dL    Nitrite Urine Negative Negative    Leukocyte Esterase Urine Negative Negative    Narrative    Microscopic not indicated   Extra Tube     Status: None ()    Narrative    The following orders were created for panel order Extra Tube.  Procedure                               Abnormality         Status                     ---------                               -----------         ------                     Extra Serum Separator Tu...[[id number]]                                                   Please view results for these tests on the individual orders.   CBC and Differential     Status: None ()    Narrative    The following " orders were created for panel order CBC and Differential.  Procedure                               Abnormality         Status                     ---------                               -----------         ------                     CBC with platelets and d...[[id number]]                                                   Please view results for these tests on the individual orders.         Answers submitted by the patient for this visit:  Patient Health Questionnaire (Submitted on 10/25/2023)  If you checked off any problems, how difficult have these problems made it for you to do your work, take care of things at home, or get along with other people?: Somewhat difficult  PHQ9 TOTAL SCORE: 14

## 2023-10-23 NOTE — TELEPHONE ENCOUNTER
Patient saw [facility] on 10/16/23. She was given antibiotics for a UTI. She has finished those and is still not feeling better. She was hoping to follow up on Wednesday when she is here for a ZioPatch placement. Please call.    Tracy Hooks on 10/23/2023 at 11:01 AM

## 2023-10-25 ENCOUNTER — OFFICE VISIT (OUTPATIENT)
Dept: FAMILY MEDICINE | Facility: OTHER | Age: 58
End: 2023-10-25
Attending: PHYSICIAN ASSISTANT
Payer: MEDICARE

## 2023-10-25 ENCOUNTER — HOSPITAL ENCOUNTER (OUTPATIENT)
Dept: CARDIOLOGY | Facility: OTHER | Age: 58
Discharge: HOME | End: 2023-10-25
Attending: INTERNAL MEDICINE
Payer: MEDICARE

## 2023-10-25 VITALS
HEART RATE: 54 BPM | SYSTOLIC BLOOD PRESSURE: 138 MMHG | RESPIRATION RATE: 18 BRPM | BODY MASS INDEX: 33.19 KG/M2 | DIASTOLIC BLOOD PRESSURE: 88 MMHG | WEIGHT: 219 LBS | TEMPERATURE: 98.4 F | OXYGEN SATURATION: 99 % | HEIGHT: 68 IN

## 2023-10-25 DIAGNOSIS — R00.2 PALPITATIONS: ICD-10-CM

## 2023-10-25 DIAGNOSIS — N30.00 ACUTE CYSTITIS WITHOUT HEMATURIA: Primary | ICD-10-CM

## 2023-10-25 DIAGNOSIS — Z13.220 LIPID SCREENING: ICD-10-CM

## 2023-10-25 DIAGNOSIS — R00.0 TACHYCARDIA: ICD-10-CM

## 2023-10-25 DIAGNOSIS — I47.10 PAROXYSMAL SUPRAVENTRICULAR TACHYCARDIA (H): ICD-10-CM

## 2023-10-25 DIAGNOSIS — Z11.3 SCREEN FOR STD (SEXUALLY TRANSMITTED DISEASE): ICD-10-CM

## 2023-10-25 LAB
ALBUMIN UR-MCNC: NEGATIVE MG/DL
APPEARANCE UR: CLEAR
BASOPHILS # BLD AUTO: 0.1 10E3/UL (ref 0–0.2)
BASOPHILS NFR BLD AUTO: 1 %
BILIRUB UR QL STRIP: NEGATIVE
C TRACH DNA SPEC QL PROBE+SIG AMP: NEGATIVE
CHOLEST SERPL-MCNC: 197 MG/DL
COLOR UR AUTO: YELLOW
EOSINOPHIL # BLD AUTO: 0.1 10E3/UL (ref 0–0.7)
EOSINOPHIL NFR BLD AUTO: 1 %
ERYTHROCYTE [DISTWIDTH] IN BLOOD BY AUTOMATED COUNT: 13.2 % (ref 10–15)
GLUCOSE UR STRIP-MCNC: NEGATIVE MG/DL
HCT VFR BLD AUTO: 39.7 % (ref 35–47)
HDLC SERPL-MCNC: 70 MG/DL
HGB BLD-MCNC: 13.3 G/DL (ref 11.7–15.7)
HGB UR QL STRIP: NEGATIVE
HOLD SPECIMEN: NORMAL
IMM GRANULOCYTES # BLD: 0 10E3/UL
IMM GRANULOCYTES NFR BLD: 0 %
KETONES UR STRIP-MCNC: NEGATIVE MG/DL
LDLC SERPL CALC-MCNC: 111 MG/DL
LEUKOCYTE ESTERASE UR QL STRIP: NEGATIVE
LYMPHOCYTES # BLD AUTO: 2.2 10E3/UL (ref 0.8–5.3)
LYMPHOCYTES NFR BLD AUTO: 26 %
MCH RBC QN AUTO: 30.2 PG (ref 26.5–33)
MCHC RBC AUTO-ENTMCNC: 33.5 G/DL (ref 31.5–36.5)
MCV RBC AUTO: 90 FL (ref 78–100)
MONOCYTES # BLD AUTO: 0.5 10E3/UL (ref 0–1.3)
MONOCYTES NFR BLD AUTO: 6 %
N GONORRHOEA DNA SPEC QL NAA+PROBE: NEGATIVE
NEUTROPHILS # BLD AUTO: 5.6 10E3/UL (ref 1.6–8.3)
NEUTROPHILS NFR BLD AUTO: 66 %
NITRATE UR QL: NEGATIVE
NONHDLC SERPL-MCNC: 127 MG/DL
NRBC # BLD AUTO: 0 10E3/UL
NRBC BLD AUTO-RTO: 0 /100
PH UR STRIP: 6.5 [PH] (ref 5–9)
PLATELET # BLD AUTO: 168 10E3/UL (ref 150–450)
RBC # BLD AUTO: 4.41 10E6/UL (ref 3.8–5.2)
SP GR UR STRIP: 1.02 (ref 1–1.03)
TRIGL SERPL-MCNC: 82 MG/DL
UROBILINOGEN UR STRIP-MCNC: NORMAL MG/DL
WBC # BLD AUTO: 8.5 10E3/UL (ref 4–11)

## 2023-10-25 PROCEDURE — 87491 CHLMYD TRACH DNA AMP PROBE: CPT | Mod: ZL | Performed by: PHYSICIAN ASSISTANT

## 2023-10-25 PROCEDURE — 80061 LIPID PANEL: CPT | Mod: ZL | Performed by: PHYSICIAN ASSISTANT

## 2023-10-25 PROCEDURE — 81003 URINALYSIS AUTO W/O SCOPE: CPT | Mod: ZL | Performed by: PHYSICIAN ASSISTANT

## 2023-10-25 PROCEDURE — 99213 OFFICE O/P EST LOW 20 MIN: CPT | Performed by: PHYSICIAN ASSISTANT

## 2023-10-25 PROCEDURE — 36415 COLL VENOUS BLD VENIPUNCTURE: CPT | Mod: ZL | Performed by: PHYSICIAN ASSISTANT

## 2023-10-25 PROCEDURE — G0463 HOSPITAL OUTPT CLINIC VISIT: HCPCS

## 2023-10-25 PROCEDURE — 93246 EXT ECG>7D<15D RECORDING: CPT

## 2023-10-25 PROCEDURE — 85025 COMPLETE CBC W/AUTO DIFF WBC: CPT | Mod: ZL | Performed by: PHYSICIAN ASSISTANT

## 2023-10-25 PROCEDURE — 93248 EXT ECG>7D<15D REV&INTERPJ: CPT | Performed by: INTERNAL MEDICINE

## 2023-10-25 PROCEDURE — G0463 HOSPITAL OUTPT CLINIC VISIT: HCPCS | Mod: 25

## 2023-10-25 ASSESSMENT — PATIENT HEALTH QUESTIONNAIRE - PHQ9
SUM OF ALL RESPONSES TO PHQ QUESTIONS 1-9: 14
10. IF YOU CHECKED OFF ANY PROBLEMS, HOW DIFFICULT HAVE THESE PROBLEMS MADE IT FOR YOU TO DO YOUR WORK, TAKE CARE OF THINGS AT HOME, OR GET ALONG WITH OTHER PEOPLE: SOMEWHAT DIFFICULT
SUM OF ALL RESPONSES TO PHQ QUESTIONS 1-9: 14

## 2023-10-25 ASSESSMENT — PAIN SCALES - GENERAL: PAINLEVEL: NO PAIN (0)

## 2023-10-25 NOTE — NURSING NOTE
"Chief Complaint   Patient presents with    Bladder Problems     Follow up UTI   WBC check  STD check also       Medication reconciliation completed.    FOOD SECURITY SCREENING QUESTIONS:    The next two questions are to help us understand your food security.  If you are feeling you need any assistance in this area, we have resources available to support you today.    Hunger Vital Signs:  Within the past 12 months we worried whether our food would run out before we got money to buy more. Never  Within the past 12 months the food we bought just didn't last and we didn't have money to get more. Never    Initial /88 (BP Location: Right arm, Patient Position: Sitting, Cuff Size: Adult Regular)   Pulse 54   Temp 98.4  F (36.9  C) (Temporal)   Resp 18   Ht 1.727 m (5' 8\")   Wt 99.3 kg (219 lb)   SpO2 99%   Breastfeeding No   BMI 33.30 kg/m   Estimated body mass index is 33.3 kg/m  as calculated from the following:    Height as of this encounter: 1.727 m (5' 8\").    Weight as of this encounter: 99.3 kg (219 lb).       Tanisha Zamarripa LPN .......  10/25/2023  10:16 AM    "

## 2023-10-30 DIAGNOSIS — I47.10 PAROXYSMAL SUPRAVENTRICULAR TACHYCARDIA (H): ICD-10-CM

## 2023-10-30 DIAGNOSIS — R00.2 PALPITATIONS: Primary | ICD-10-CM

## 2023-10-30 DIAGNOSIS — R00.0 TACHYCARDIA: ICD-10-CM

## 2023-11-08 ENCOUNTER — TELEPHONE (OUTPATIENT)
Dept: CARDIOLOGY | Facility: OTHER | Age: 58
End: 2023-11-08
Payer: MEDICARE

## 2023-11-09 NOTE — TELEPHONE ENCOUNTER
Left message to call back. Need to schedule patient to see Jaime Morales MD via telemed.  Niki Junior RN......November 9, 2023...11:08 AM

## 2023-11-09 NOTE — TELEPHONE ENCOUNTER
Patient may see Jaime Morales MD via telemed at [facility] on 11/28/23 with 7:30 check in.  Niki Junior RN......November 9, 2023...4:23 PM

## 2023-11-14 NOTE — TELEPHONE ENCOUNTER
Patient notified me that her zio patch fell off after a couple days and that she needs to get a new one put on.  She is out of town right now but will call to reschedule when she gets back.  I will wait to schedule the telemed until the zio patch is done.  Niki Junior RN......November 14, 2023...1:38 PM

## 2023-11-27 ENCOUNTER — HOSPITAL ENCOUNTER (OUTPATIENT)
Dept: CARDIOLOGY | Facility: OTHER | Age: 58
Discharge: HOME | End: 2023-11-27
Attending: INTERNAL MEDICINE | Admitting: INTERNAL MEDICINE
Payer: MEDICARE

## 2023-11-27 DIAGNOSIS — R00.2 PALPITATIONS: ICD-10-CM

## 2023-11-27 DIAGNOSIS — I47.10 PAROXYSMAL SUPRAVENTRICULAR TACHYCARDIA (H): ICD-10-CM

## 2023-11-27 DIAGNOSIS — R00.0 TACHYCARDIA: ICD-10-CM

## 2023-11-27 PROCEDURE — 999N000157 HC STATISTIC RCP TIME EA 10 MIN

## 2023-11-27 PROCEDURE — 93246 EXT ECG>7D<15D RECORDING: CPT

## 2023-12-13 ENCOUNTER — TELEPHONE (OUTPATIENT)
Dept: CARDIOLOGY | Facility: OTHER | Age: 58
End: 2023-12-13
Payer: MEDICARE

## 2023-12-13 NOTE — TELEPHONE ENCOUNTER
Called patient to schedule her to see Jaime Morales MD via telemedicine as she has had her zio patch done and sent it in 2 days ago.  Patient notified of Eddie Marvin DO's plan for her to have zio patch and follow up with Jaime Morales MD regarding this.  She is declining to schedule at this time with Jaime Morales MD as she wants Eddie Marvin DO to look at the results and decide if she needs to see Dr. Morales.  To Eddie Marvin DO to address.  Niki Junior RN......December 13, 2023...11:27 AM

## 2023-12-18 ENCOUNTER — HOSPITAL ENCOUNTER (EMERGENCY)
Facility: OTHER | Age: 58
Discharge: HOME | End: 2023-12-18
Payer: MEDICARE

## 2023-12-18 ENCOUNTER — OFFICE VISIT (OUTPATIENT)
Dept: FAMILY MEDICINE | Facility: OTHER | Age: 58
End: 2023-12-18
Attending: NURSE PRACTITIONER
Payer: MEDICARE

## 2023-12-18 VITALS
OXYGEN SATURATION: 99 % | SYSTOLIC BLOOD PRESSURE: 202 MMHG | RESPIRATION RATE: 16 BRPM | HEART RATE: 52 BPM | WEIGHT: 220 LBS | TEMPERATURE: 96.8 F | DIASTOLIC BLOOD PRESSURE: 68 MMHG | BODY MASS INDEX: 33.34 KG/M2 | HEIGHT: 68 IN

## 2023-12-18 VITALS
HEART RATE: 54 BPM | RESPIRATION RATE: 16 BRPM | OXYGEN SATURATION: 98 % | WEIGHT: 229.3 LBS | DIASTOLIC BLOOD PRESSURE: 98 MMHG | BODY MASS INDEX: 34.75 KG/M2 | HEIGHT: 68 IN | TEMPERATURE: 97.8 F | SYSTOLIC BLOOD PRESSURE: 194 MMHG

## 2023-12-18 DIAGNOSIS — M79.605 ACUTE PAIN OF LEFT LOWER EXTREMITY: Primary | ICD-10-CM

## 2023-12-18 DIAGNOSIS — R03.0 ELEVATED BLOOD-PRESSURE READING WITHOUT DIAGNOSIS OF HYPERTENSION: ICD-10-CM

## 2023-12-18 DIAGNOSIS — L98.9 CHANGING SKIN LESION: ICD-10-CM

## 2023-12-18 PROCEDURE — 99213 OFFICE O/P EST LOW 20 MIN: CPT | Performed by: NURSE PRACTITIONER

## 2023-12-18 PROCEDURE — G0463 HOSPITAL OUTPT CLINIC VISIT: HCPCS

## 2023-12-18 ASSESSMENT — PATIENT HEALTH QUESTIONNAIRE - PHQ9
10. IF YOU CHECKED OFF ANY PROBLEMS, HOW DIFFICULT HAVE THESE PROBLEMS MADE IT FOR YOU TO DO YOUR WORK, TAKE CARE OF THINGS AT HOME, OR GET ALONG WITH OTHER PEOPLE: NOT DIFFICULT AT ALL
SUM OF ALL RESPONSES TO PHQ QUESTIONS 1-9: 0
SUM OF ALL RESPONSES TO PHQ QUESTIONS 1-9: 0

## 2023-12-18 NOTE — ED TRIAGE NOTES
Pt arrives via private vehicle with c/o knee pain that started. Pt stepped down and stated after that she couldn't put any weight on her leg. Pt was noted walking around triage room when writer entered room, putting weight on both legs. Pt states she also might want something on her arm burned off while she is here, pt educated that we probably wont do that in the emergency department.      Triage Assessment (Adult)       Row Name 12/18/23 1052          Triage Assessment    Airway WDL WDL        Respiratory WDL    Respiratory WDL WDL        Skin Circulation/Temperature WDL    Skin Circulation/Temperature WDL WDL        Cardiac WDL    Cardiac WDL WDL        Peripheral/Neurovascular WDL    Peripheral Neurovascular WDL WDL        Cognitive/Neuro/Behavioral WDL    Cognitive/Neuro/Behavioral WDL WDL

## 2023-12-18 NOTE — NURSING NOTE
"Chief Complaint   Patient presents with    Musculoskeletal Problem     Left leg pain started this A.M. and is now better but is concerned about what it was.Right arm wart removal.         Initial BP (!) 217/99 (BP Location: Right arm, Patient Position: Sitting, Cuff Size: Adult Regular)   Pulse 54   Temp 97.8  F (36.6  C) (Temporal)   Resp 16   Ht 1.727 m (5' 8\")   Wt 104 kg (229 lb 4.8 oz)   SpO2 98%   BMI 34.86 kg/m   Estimated body mass index is 34.86 kg/m  as calculated from the following:    Height as of this encounter: 1.727 m (5' 8\").    Weight as of this encounter: 104 kg (229 lb 4.8 oz).     Advance Care Directive on file? no  Advance Care Directive provided to patient? no    FOOD SECURITY SCREENING QUESTIONS:    The next two questions are to help us understand your food security.  If you are feeling you need any assistance in this area, we have resources available to support you today.    Hunger Vital Signs:  Within the past 12 months we worried whether our food would run out before we got money to buy more. Never  Within the past 12 months the food we bought just didn't last and we didn't have money to get more. Never  Merari Marlow LPN on 12/18/2023 at 12:00 PM      Merari Marlow     "

## 2023-12-18 NOTE — PROGRESS NOTES
ASSESSMENT/PLAN:     I have reviewed the nursing notes.  I have reviewed the findings, diagnosis, plan and need for follow up with the patient.      1. Acute pain of left lower extremity    Acute onset of left posterior thigh and knee pain this morning after she stepped down from her bathroom vanity where she had been sitting on her bent knee applying makeup.  She was unable to straighten the knee or bear weight.  She initially presented to the ED but left due to wait time.  States pain and restricted movement suddenly resolved prior to visit today.  No knee swelling or abnormal ROM, normal gait.      Discussed with patient she likely had a ligament/tendon cramp that resolved.  May try ice, heat, topical pain reliever, massage, etc if pain returns  May use over-the-counter Tylenol or ibuprofen PRN    Discussed warning signs/symptoms indicative of need to f/u  Follow up if symptoms persist or worsen or concerns    2. Elevated blood-pressure reading without diagnosis of hypertension    Blood pressure elevated today in clinic.  No hypertensive urgency symptoms (no vision change, dizziness, light headedness, palpitations or racing heart beat, chest pain, chest tightness, chest heaviness, shortness of breath).  Patient with known hx of SVT, last seen for it in  ED 2 months ago (10/6/23) EKG at that time with normal sinus rhythm.  Seen by cardiology in follow up on 10/19/23 with [facility] and possibility of EP study/ablasion in the future.    She will follow up with cardiology   Return to clinic or ED if acute symptoms or concerns     3. Changing skin lesion    Right forearm with chronic skin lesion with recent change in shape, size and appearance.  She is requesting to have it frozen off today.    I explained to patient due to the change in size, shape and appearance this should be removed and biopsied.  Appointment scheduled with primary care.          I explained my diagnostic considerations and recommendations to the  "patient, who voiced understanding and agreement with the treatment plan. All questions were answered. We discussed potential side effects of any prescribed or recommended therapies, as well as expectations for response to treatments.    Rhianna Hsieh NP  [facility]      SUBJECTIVE:   Gay Romero is a 58 year old female who presents to clinic today for the following health issues:  Leg pain and elevated BP    HPI  Patient developed acute sudden onset of right knee pain this morning when she stepped down from her bathroom vanity.  States she does not normally sit at the vanity and was sitting in a \"weird\" position - she was sitting on the vanity counter with her right knee in a persistent bent position.  She was unable to straighten the knee or bear weight initially.  Pain was all behind the posterior knee and distal thigh.  No known injury or trauma.  She presented to the ED but by that time pain suddenly resolved and the wait time was lengthy so [facility].  She is noted to have elevated BP of 200's/90's today.  Denies vision change, dizziness, light headedness, palpitations or racing heart beat, chest pain, chest tightness, chest heaviness, shortness of breath.  Patient with known hx of SVT, last seen for it in  ED 2 months ago (10/6/23) EKG at that time with normal sinus rhythm.  Seen by cardiology in follow up on 10/19/23 with [facility] and possibility of EP study/ablasion in the future.    Right forearm with chronic skin lesion with recent change in shape, size and appearance.  She is requesting to have it frozen off today.        History reviewed. No pertinent past medical history.  History reviewed. No pertinent surgical history.  Social History     Tobacco Use    Smoking status: Every Day     Packs/day: .5     Types: Cigarettes    Smokeless tobacco: Former   Substance Use Topics    Alcohol use: Not Currently     Current Outpatient Medications " "  Medication Sig Dispense Refill    buPROPion (WELLBUTRIN XL) 300 MG 24 hr tablet Take 1 tablet by mouth daily at 2 pm      hydrOXYzine (ATARAX) 50 MG tablet Take 50 mg by mouth 3 times daily as needed      ibuprofen (ADVIL/MOTRIN) 600 MG tablet Take 600 mg by mouth 3 times daily      lamoTRIgine (LAMICTAL) 25 MG tablet Take 25 mg by mouth At Bedtime      naltrexone (DEPADE/REVIA) 50 MG tablet Take 50 mg by mouth daily      QUEtiapine (SEROQUEL) 100 MG tablet Take 100 mg by mouth      topiramate (TOPAMAX) 25 MG tablet Take 25 mg by mouth 2 times daily      VRAYLAR 1.5 MG capsule        Allergies   Allergen Reactions    Influenza Virus Vaccine [Influenza Virus Vaccine]      Guillain-[place] syndrome    Hydrocodone Rash     possible         Past medical history, past surgical history, current medications and allergies reviewed and accurate to the best of my knowledge.        OBJECTIVE:     BP (!) 194/98 (BP Location: Right arm, Patient Position: Sitting, Cuff Size: Adult Large)   Pulse 54   Temp 97.8  F (36.6  C) (Temporal)   Resp 16   Ht 1.727 m (5' 8\")   Wt 104 kg (229 lb 4.8 oz)   SpO2 98%   BMI 34.86 kg/m    Body mass index is 34.86 kg/m .        Physical Exam  General Appearance: Well appearing adult female, appropriate appearance for age. No acute distress  Respiratory: normal chest wall and respirations.  Normal effort.  Clear to auscultation bilaterally, no wheezing, crackles or rhonchi.  No increased work of breathing.  No cough appreciated.  Cardiac: RRR with no murmurs.  CMS intact to left lower extremity, no noted edema, calf soft and nontender.    Musculoskeletal:  Equal movement of bilateral upper extremities.  Equal movement of bilateral lower extremities.  No palpable swelling or pain of left lower extremity including thigh and knee.  Normal ROM of left knee without difficulty.  Normal gait.    Dermatological: skin intact to left lower extremity without noted rash, bruising, erythema or warmth.  " Right forearm with single irregularly shaped flesh colored skin lesion with raised irregular surface and scaling.    Psychological: normal affect, alert, oriented, and pleasant.

## 2023-12-27 NOTE — TELEPHONE ENCOUNTER
"Per Eddie Marvin, DO: \"Gay has had x 3 Zio patch's.  Interestingly enough, on each 1 of these, she only had 2 episodes of SVT.  The longest episode on any of these was only 14 beats.  Because she did not have an extended run of SVT, she would not necessarily need to see EP.  But just because she did not have an extended run of SVT on these monitors, it does not mean she would not have 1 in the future.  If the patient prefers, we can hold off on referral to Dr. Morales.  Again, she may have a reoccurrence of an extended episode of SVT in the future.  She certainly could try Valsalva maneuvers.  I could also give her prescription for as needed diltiazem if she would like.  This could be taken if she has an extended run of palpitations/elevated heart rates.  But for now, she can hold off on seeing Dr. Ny if she does not want to.    Dr. Marvin\"    Patient notified of this and verbalized understanding. She will hold off on diltiazem at this time.  Niki Junior RN......December 27, 2023...9:16 AM   "

## 2023-12-28 NOTE — PROGRESS NOTES
Assessment & Plan     1. Family history of thyroid disease  2. Weight gain  3. Hair thinning  Patient requested thyroid check due to weight gain, hair thinning, and FMH. TSH returned normal. Encourage to focus on healthy lifestyle as symptoms have seemed to improve prior to today's appointment. Follow up as needed.   - TSH Reflex GH; Future  - TSH Reflex GH    4. Elevated blood pressure reading without diagnosis of hypertension  BP initially elevated in clinic, down to 138/84 on recheck. Encourage to closely monitor BP at home over next 2 weeks and follow up in clinic at that time for re-evaluation. Encourage healthy lifestyle.   - TSH Reflex GH; Future  - TSH Reflex GH    5. Skin lesion  For the lesion, cryotherapy was used in 3-5 second cycles x3. Patient tolerated procedure well. Discussed side effects of redness, blistering and pain and typically resolve in a few days without any intervention. If minimal improvement can follow up with general surgery or dermatology for excisional biopsy.   - DESTRUCT BENIGN LESION, UP TO 14      No follow-ups on file.    Kamla Paulino PA-C  [facility]   Gay is a 58 year old, presenting for the following health issues:  Hypertension    History of Present Illness       Reason for visit:  Blood pressure thyroid  Symptom onset:  More than a month  Symptom intensity:  Mild  Symptom progression:  Staying the same  Had these symptoms before:  Yes  Has tried/received treatment for these symptoms:  No  What makes it worse:  No  What makes it better:  No    She eats 0-1 servings of fruits and vegetables daily.She consumes 1 sweetened beverage(s) daily.She exercises with enough effort to increase her heart rate 10 to 19 minutes per day.  She exercises with enough effort to increase her heart rate 5 days per week.   She is taking medications regularly.     Here for evaluation of multiple concerns.     Has been noticing difficulties with  weight gain. Has not noticed any change in diet but has not been focusing on healthy lifestyle.Wondering if weight could be associated with psychiatric medications.  Had been noticing some hair thinning as well that seems to have improved. Notes both sisters have a history of thyroid disease. Did have a mildly elevated TSH at 4.25 last year.  Patient also reports she has been monitoring her blood pressure at home and has been told by her mother it has been elevated.  Does not recall the numbers she is getting home.  Blood pressure initially elevated 140/86 in clinic.  No associated chest pain or pressure, palpitations, dizziness lightheadedness, syncope, headaches, vision change, shortness of breath.  Patient also with a skin lesion on her right forearm that has been present for quite some time.  Has grown slightly more recently.  She is scheduled to meet with general surgery for removal however she would like to try cryotherapy first.  Reports she had had good success with cryotherapy with previous skin lesions and would like to start there.    PAST MEDICAL HISTORY: No past medical history on file.    PAST SURGICAL HISTORY: No past surgical history on file.    FAMILY HISTORY: No family history on file.    SOCIAL HISTORY:   Social History     Tobacco Use    Smoking status: Every Day     Packs/day: .5     Types: Cigarettes    Smokeless tobacco: Former   Substance Use Topics    Alcohol use: Not Currently        Allergies   Allergen Reactions    Influenza Virus Vaccine [Influenza Virus Vaccine]      Guillain-[place] syndrome    Hydrocodone Rash     possible     Current Outpatient Medications   Medication    buPROPion (WELLBUTRIN XL) 300 MG 24 hr tablet    hydrOXYzine (ATARAX) 50 MG tablet    ibuprofen (ADVIL/MOTRIN) 600 MG tablet    lamoTRIgine (LAMICTAL) 25 MG tablet    naltrexone (DEPADE/REVIA) 50 MG tablet    QUEtiapine (SEROQUEL) 100 MG tablet    topiramate (TOPAMAX) 25 MG tablet    VRAYLAR 1.5 MG capsule     No  "current facility-administered medications for this visit.         Review of Systems   Per HPI        Objective    BP (!) 148/86   Pulse 67   Temp (!) 96.4  F (35.8  C)   Resp 12   Ht 1.727 m (5' 8\")   Wt 103.1 kg (227 lb 6.4 oz)   SpO2 100%   BMI 34.58 kg/m    Body mass index is 34.58 kg/m .  Physical Exam   General: Pleasant, in no apparent distress.  Cardiovascular: Regular rate and rhythm with S1 equal to S2. No murmurs, friction rubs, or gallops.   Respiratory: Lungs are resonant and clear to auscultation bilaterally. No wheezes, crackles, or rhonchi.  Skin: 4 mm raised slightly erythematous lesion to right dorsal forearm  Psych: Appropriate mood and affect.    Results for orders placed or performed in visit on 01/03/24   TSH Reflex GH     Status: Normal   Result Value Ref Range    TSH 2.99 0.30 - 4.20 uIU/mL       "

## 2024-01-03 ENCOUNTER — OFFICE VISIT (OUTPATIENT)
Dept: FAMILY MEDICINE | Facility: OTHER | Age: 59
End: 2024-01-03
Attending: PHYSICIAN ASSISTANT
Payer: MEDICARE

## 2024-01-03 VITALS
RESPIRATION RATE: 12 BRPM | SYSTOLIC BLOOD PRESSURE: 148 MMHG | TEMPERATURE: 96.4 F | BODY MASS INDEX: 34.46 KG/M2 | WEIGHT: 227.4 LBS | HEIGHT: 68 IN | HEART RATE: 67 BPM | OXYGEN SATURATION: 100 % | DIASTOLIC BLOOD PRESSURE: 86 MMHG

## 2024-01-03 DIAGNOSIS — R03.0 ELEVATED BLOOD PRESSURE READING WITHOUT DIAGNOSIS OF HYPERTENSION: ICD-10-CM

## 2024-01-03 DIAGNOSIS — L98.9 SKIN LESION: ICD-10-CM

## 2024-01-03 DIAGNOSIS — Z83.49 FAMILY HISTORY OF THYROID DISEASE: Primary | ICD-10-CM

## 2024-01-03 DIAGNOSIS — L65.9 HAIR THINNING: ICD-10-CM

## 2024-01-03 DIAGNOSIS — R63.5 WEIGHT GAIN: ICD-10-CM

## 2024-01-03 LAB — TSH SERPL DL<=0.005 MIU/L-ACNC: 2.99 UIU/ML (ref 0.3–4.2)

## 2024-01-03 PROCEDURE — G0463 HOSPITAL OUTPT CLINIC VISIT: HCPCS | Mod: 25

## 2024-01-03 PROCEDURE — 36415 COLL VENOUS BLD VENIPUNCTURE: CPT | Mod: ZL | Performed by: PHYSICIAN ASSISTANT

## 2024-01-03 PROCEDURE — 84443 ASSAY THYROID STIM HORMONE: CPT | Mod: ZL | Performed by: PHYSICIAN ASSISTANT

## 2024-01-03 PROCEDURE — G0463 HOSPITAL OUTPT CLINIC VISIT: HCPCS

## 2024-01-03 PROCEDURE — 17110 DESTRUCTION B9 LES UP TO 14: CPT | Performed by: PHYSICIAN ASSISTANT

## 2024-01-03 PROCEDURE — 99213 OFFICE O/P EST LOW 20 MIN: CPT | Mod: 25 | Performed by: PHYSICIAN ASSISTANT

## 2024-01-03 ASSESSMENT — PATIENT HEALTH QUESTIONNAIRE - PHQ9
10. IF YOU CHECKED OFF ANY PROBLEMS, HOW DIFFICULT HAVE THESE PROBLEMS MADE IT FOR YOU TO DO YOUR WORK, TAKE CARE OF THINGS AT HOME, OR GET ALONG WITH OTHER PEOPLE: NOT DIFFICULT AT ALL
SUM OF ALL RESPONSES TO PHQ QUESTIONS 1-9: 6
SUM OF ALL RESPONSES TO PHQ QUESTIONS 1-9: 6

## 2024-01-03 ASSESSMENT — PAIN SCALES - GENERAL: PAINLEVEL: NO PAIN (0)

## 2024-01-03 NOTE — NURSING NOTE
Patient presents to clinic with concerns about blood pressure, she has been taking it at home but has no numbers to give. She would also would like tyroid checked due to hair loss and weight gain.  Ashly Otoole LPN ....................  1/3/2024   8:15 AM  EXT [id number]

## 2024-03-14 PROCEDURE — 93248 EXT ECG>7D<15D REV&INTERPJ: CPT | Performed by: INTERNAL MEDICINE

## 2024-03-23 ENCOUNTER — OFFICE VISIT (OUTPATIENT)
Dept: FAMILY MEDICINE | Facility: OTHER | Age: 59
End: 2024-03-23
Payer: MEDICARE

## 2024-03-23 VITALS
SYSTOLIC BLOOD PRESSURE: 122 MMHG | WEIGHT: 214.7 LBS | OXYGEN SATURATION: 98 % | HEIGHT: 68 IN | BODY MASS INDEX: 32.54 KG/M2 | HEART RATE: 52 BPM | DIASTOLIC BLOOD PRESSURE: 78 MMHG | TEMPERATURE: 97.2 F | RESPIRATION RATE: 20 BRPM

## 2024-03-23 DIAGNOSIS — S30.861A INSECT BITE OF ABDOMINAL WALL, INITIAL ENCOUNTER: Primary | ICD-10-CM

## 2024-03-23 DIAGNOSIS — Z11.4 ENCOUNTER FOR SCREENING FOR HUMAN IMMUNODEFICIENCY VIRUS (HIV): ICD-10-CM

## 2024-03-23 DIAGNOSIS — N89.8 VAGINAL DISCHARGE: ICD-10-CM

## 2024-03-23 DIAGNOSIS — Z20.2 STD EXPOSURE: ICD-10-CM

## 2024-03-23 DIAGNOSIS — R30.0 DYSURIA: ICD-10-CM
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
## 2024-03-23 LAB
ANION GAP SERPL CALCULATED.3IONS-SCNC: 9 MMOL/L (ref 7–15)
BASOPHILS # BLD AUTO: 0 10E3/UL (ref 0–0.2)
BASOPHILS NFR BLD AUTO: 0 %
BUN SERPL-MCNC: 16.7 MG/DL (ref 8–23)
C TRACH DNA SPEC QL PROBE+SIG AMP: NEGATIVE
CALCIUM SERPL-MCNC: 10.3 MG/DL (ref 8.8–10.2)
CHLORIDE SERPL-SCNC: 103 MMOL/L (ref 98–107)
CREAT SERPL-MCNC: 1.19 MG/DL (ref 0.51–0.95)
DEPRECATED HCO3 PLAS-SCNC: 27 MMOL/L (ref 22–29)
EGFRCR SERPLBLD CKD-EPI 2021: 52 ML/MIN/1.73M2
EOSINOPHIL # BLD AUTO: 0.1 10E3/UL (ref 0–0.7)
EOSINOPHIL NFR BLD AUTO: 1 %
ERYTHROCYTE [DISTWIDTH] IN BLOOD BY AUTOMATED COUNT: 13 % (ref 10–15)
GLUCOSE SERPL-MCNC: 81 MG/DL (ref 70–99)
HCT VFR BLD AUTO: 41.9 % (ref 35–47)
HGB BLD-MCNC: 13.8 G/DL (ref 11.7–15.7)
HOLD SPECIMEN: NORMAL
HOLD SPECIMEN: NORMAL
IMM GRANULOCYTES # BLD: 0 10E3/UL
IMM GRANULOCYTES NFR BLD: 0 %
LYMPHOCYTES # BLD AUTO: 2.3 10E3/UL (ref 0.8–5.3)
LYMPHOCYTES NFR BLD AUTO: 21 %
MCH RBC QN AUTO: 29.8 PG (ref 26.5–33)
MCHC RBC AUTO-ENTMCNC: 32.9 G/DL (ref 31.5–36.5)
MCV RBC AUTO: 91 FL (ref 78–100)
MONOCYTES # BLD AUTO: 0.8 10E3/UL (ref 0–1.3)
MONOCYTES NFR BLD AUTO: 8 %
N GONORRHOEA DNA SPEC QL NAA+PROBE: NEGATIVE
NEUTROPHILS # BLD AUTO: 7.4 10E3/UL (ref 1.6–8.3)
NEUTROPHILS NFR BLD AUTO: 70 %
NRBC # BLD AUTO: 0 10E3/UL
NRBC BLD AUTO-RTO: 0 /100
PLATELET # BLD AUTO: 191 10E3/UL (ref 150–450)
POTASSIUM SERPL-SCNC: 4.3 MMOL/L (ref 3.4–5.3)
RBC # BLD AUTO: 4.63 10E6/UL (ref 3.8–5.2)
SODIUM SERPL-SCNC: 139 MMOL/L (ref 135–145)
WBC # BLD AUTO: 10.7 10E3/UL (ref 4–11)

## 2024-03-23 PROCEDURE — 99214 OFFICE O/P EST MOD 30 MIN: CPT | Performed by: REGISTERED NURSE

## 2024-03-23 PROCEDURE — 85025 COMPLETE CBC W/AUTO DIFF WBC: CPT | Mod: ZL | Performed by: REGISTERED NURSE

## 2024-03-23 PROCEDURE — 80048 BASIC METABOLIC PNL TOTAL CA: CPT | Mod: ZL | Performed by: REGISTERED NURSE

## 2024-03-23 PROCEDURE — 86780 TREPONEMA PALLIDUM: CPT | Mod: ZL | Performed by: REGISTERED NURSE

## 2024-03-23 PROCEDURE — 36415 COLL VENOUS BLD VENIPUNCTURE: CPT | Mod: ZL | Performed by: REGISTERED NURSE

## 2024-03-23 PROCEDURE — 87491 CHLMYD TRACH DNA AMP PROBE: CPT | Mod: ZL | Performed by: REGISTERED NURSE

## 2024-03-23 PROCEDURE — 87389 HIV-1 AG W/HIV-1&-2 AB AG IA: CPT | Mod: ZL | Performed by: REGISTERED NURSE

## 2024-03-23 PROCEDURE — G0463 HOSPITAL OUTPT CLINIC VISIT: HCPCS

## 2024-03-23 RX ORDER — MULTIPLE VITAMINS W/ MINERALS TAB 9MG-400MCG
1 TAB ORAL DAILY
COMMUNITY

## 2024-03-23 RX ORDER — DOXYCYCLINE 100 MG/1
100 CAPSULE ORAL 2 TIMES DAILY
Qty: 20 CAPSULE | Refills: 0 | Status: SHIPPED | OUTPATIENT
Start: 2024-03-23 | End: 2024-04-02

## 2024-03-23 ASSESSMENT — PAIN SCALES - GENERAL: PAINLEVEL: MODERATE PAIN (5)

## 2024-03-23 ASSESSMENT — ENCOUNTER SYMPTOMS
DYSURIA: 0
FREQUENCY: 0
WOUND: 1

## 2024-03-23 NOTE — PATIENT INSTRUCTIONS
I have started you on an antibiotic called doxycycline.  Take this twice daily for for 10 days.  This can make you sensitive to the sun.  Wear sunscreen and large brim hat when outside.  Take 2 hours apart from antacids or dairy.    Your wound should look begin to look better by Monday.  If not, I want you to follow-up.    You will be notified of the remainder of your test when available.

## 2024-03-23 NOTE — PROGRESS NOTES
Gay Romero  1965    ASSESSMENT/PLAN:   1. Insect bite of abdominal wall, initial encounter    Insect bite to left abdominal wall with eschar present and surrounding erythema.  Reviewed with the ER doc as this progressed significantly over the past 4 days.  Will start patient on doxycycline twice daily for 10 days.  She is been instructed that erythema should significantly improve by Monday otherwise instructed to follow-up.    Dressing applied. Photos obtained.   CBC and BMP reviewed.  White count stable.  Will place wound consult monitor progression of healing.    - CBC and Differential  - Basic Metabolic Panel; Future  - Basic Metabolic Panel  - doxycycline hyclate (VIBRAMYCIN) 100 MG capsule; Take 1 capsule (100 mg) by mouth 2 times daily for 10 days  Dispense: 20 capsule; Refill: 0    2. STD exposure  3. Dysuria  4. Vaginal discharge  5. Encounter for screening for human immunodeficiency virus (HIV)  Abnormal vaginal discharge 3 days sexual intercourse.   GC chlamydia negative.  HIV and treponema pending.  Patient will be notified of results when available.    - GC/Chlamydia by PCR  - HIV Antigen Antibody Combo  - Treponema Ab w Reflex to RPR and Titer    Patient agrees with plan of care and verbalizes understating. AVS printed. Patient education provided verbally and written instructions provided as requested. Patient made aware of emergent signs and symptoms to monitor for and when to seek additional care/follow up.     SUBJECTIVE:   CHIEF COMPLAINT/ REASON FOR VISIT  Patient presents with:  Exposure to STD  Derm Problem: Sore on LLQ     HISTORY OF PRESENT ILLNESS  Gay Romero is a pleasant 58 year old female [facility] for concerns of possible STD and a wound to her left lower quadrant.    She noticed a bite adryan to her left abdomen 4 days ago.  It progressively worsened with blackened eschar center with the surrounding erythema continuing to grow.  Concerns for  "possible spider bite.  She does carry wood into her basement for heating.  No recent travel.  Currently rates pain 6/10.  She has been using ibuprofen.  Reports increased fatigue, soreness and bodyaches.    Regarding her vaginal discharge, she noticed this 3 days after sexual intercourse with a new partner.  Denies dysuria, urgency, vaginal itching or frequency.  Discharge is clear.      History provided by patient.    I have reviewed the nursing notes.  I have reviewed allergies, medication list, problem list, and past medical history.    REVIEW OF SYSTEMS  Review of Systems   Genitourinary:  Positive for vaginal discharge. Negative for dysuria, frequency and urgency.   Skin:  Positive for wound (Left lower abdomen).        VITAL SIGNS  Vitals:    03/23/24 1448   BP: 122/78   BP Location: Left arm   Patient Position: Sitting   Cuff Size: Adult Large   Pulse: 52   Resp: 20   Temp: 97.2  F (36.2  C)   TempSrc: Tympanic   SpO2: 98%   Weight: 97.4 kg (214 lb 11.2 oz)   Height: 1.727 m (5' 8\")      Body mass index is 32.65 kg/m .    OBJECTIVE:   PHYSICAL EXAM  Physical Exam  Constitutional:       Appearance: Normal appearance. She is not ill-appearing.   Skin:     Comments: Wound left lower abdomen with blackened eschar center. Blackened center measuring 1.5 cm in diameter.  Surrounding erythema extending up to 15 cm.  See photo.   Neurological:      Mental Status: She is alert.          Left abdominal wall    DIAGNOSTICS  Results for orders placed or performed in visit on 03/23/24   Basic Metabolic Panel     Status: Abnormal   Result Value Ref Range    Sodium 139 135 - 145 mmol/L    Potassium 4.3 3.4 - 5.3 mmol/L    Chloride 103 98 - 107 mmol/L    Carbon Dioxide (CO2) 27 22 - 29 mmol/L    Anion Gap 9 7 - 15 mmol/L    Urea Nitrogen 16.7 8.0 - 23.0 mg/dL    Creatinine 1.19 (H) 0.51 - 0.95 mg/dL    GFR Estimate 52 (L) >60 mL/min/1.73m2    Calcium 10.3 (H) 8.8 - 10.2 mg/dL    Glucose 81 70 - 99 mg/dL   CBC with platelets " and differential     Status: None   Result Value Ref Range    WBC Count 10.7 4.0 - 11.0 10e3/uL    RBC Count 4.63 3.80 - 5.20 10e6/uL    Hemoglobin 13.8 11.7 - 15.7 g/dL    Hematocrit 41.9 35.0 - 47.0 %    MCV 91 78 - 100 fL    MCH 29.8 26.5 - 33.0 pg    MCHC 32.9 31.5 - 36.5 g/dL    RDW 13.0 10.0 - 15.0 %    Platelet Count 191 150 - 450 10e3/uL    % Neutrophils 70 %    % Lymphocytes 21 %    % Monocytes 8 %    % Eosinophils 1 %    % Basophils 0 %    % Immature Granulocytes 0 %    NRBCs per 100 WBC 0 <1 /100    Absolute Neutrophils 7.4 1.6 - 8.3 10e3/uL    Absolute Lymphocytes 2.3 0.8 - 5.3 10e3/uL    Absolute Monocytes 0.8 0.0 - 1.3 10e3/uL    Absolute Eosinophils 0.1 0.0 - 0.7 10e3/uL    Absolute Basophils 0.0 0.0 - 0.2 10e3/uL    Absolute Immature Granulocytes 0.0 <=0.4 10e3/uL    Absolute NRBCs 0.0 10e3/uL   Extra Tube     Status: None    Narrative    The following orders were created for panel order Extra Tube.  Procedure                               Abnormality         Status                     ---------                               -----------         ------                     Extra Blue Top Tube[[id number]]                              Final result               Extra Green Top (Lithium...[[id number]]                      Final result                 Please view results for these tests on the individual orders.   Extra Blue Top Tube     Status: None   Result Value Ref Range    Hold Specimen x    Extra Green Top (Lithium Heparin) Tube     Status: None   Result Value Ref Range    Hold Specimen x    GC/Chlamydia by PCR     Status: Normal    Specimen: Urine, Voided   Result Value Ref Range    Chlamydia Trachomatis Negative Negative    Neisseria gonorrhoeae Negative Negative    Narrative    Assay performed using [employer] real-time, reverse-transcriptase PCR.   CBC and Differential     Status: None    Narrative    The following orders were created for panel order CBC and Differential.  Procedure                                Abnormality         Status                     ---------                               -----------         ------                     CBC with platelets and d...[[id number]]                      Final result               RBC and Platelet Morphology[[id number]]                                                   Please view results for these tests on the individual orders.        ANATOLIY Espinoza [facility]

## 2024-03-23 NOTE — NURSING NOTE
"Pt presents to  for possible STD and bite on her LLQ. Pt states she knows she was exposed to an STD, but unsure of which one - having symptoms of vaginal discharge.  Pt states the skin problem appeared 4 days ago, she said it looked like a pimple. Pt states they have found brown recluse where she lives, and she has also been exposed to MRSA within the past week. Pt has been trying to keep the wound clean and applying antibacterial. Pt states the past few days she has been very sore and fatigued.  Pt does not want to do the PHQ-9 questions.    Chief Complaint   Patient presents with    Exposure to STD    Derm Problem     Sore on LLQ       FOOD SECURITY SCREENING QUESTIONS  Hunger Vital Signs:  Within the past 12 months we worried whether our food would run out before we got money to buy more. Never  Within the past 12 months the food we bought just didn't last and we didn't have money to get more. Never  Xuancriss Mataon 3/23/2024 2:50 PM      Initial /78 (BP Location: Left arm, Patient Position: Sitting, Cuff Size: Adult Large)   Pulse 52   Temp 97.2  F (36.2  C) (Tympanic)   Resp 20   Ht 1.727 m (5' 8\")   Wt 97.4 kg (214 lb 11.2 oz)   SpO2 98%   BMI 32.65 kg/m   Estimated body mass index is 32.65 kg/m  as calculated from the following:    Height as of this encounter: 1.727 m (5' 8\").    Weight as of this encounter: 97.4 kg (214 lb 11.2 oz).  Medication Reconciliation: complete    Xuan Tyler  "

## 2024-03-24 LAB — HIV 1+2 AB+HIV1 P24 AG SERPL QL IA: NONREACTIVE

## 2024-03-25 ENCOUNTER — APPOINTMENT (OUTPATIENT)
Dept: CT IMAGING | Facility: OTHER | Age: 59
End: 2024-03-25
Payer: MEDICARE

## 2024-03-25 ENCOUNTER — HOSPITAL ENCOUNTER (EMERGENCY)
Facility: OTHER | Age: 59
Discharge: HOME | End: 2024-03-25
Payer: MEDICARE

## 2024-03-25 ENCOUNTER — TELEPHONE (OUTPATIENT)
Dept: FAMILY MEDICINE | Facility: OTHER | Age: 59
End: 2024-03-25
Payer: MEDICARE

## 2024-03-25 VITALS
HEART RATE: 50 BPM | OXYGEN SATURATION: 99 % | SYSTOLIC BLOOD PRESSURE: 173 MMHG | RESPIRATION RATE: 20 BRPM | DIASTOLIC BLOOD PRESSURE: 79 MMHG | TEMPERATURE: 97.6 F

## 2024-03-25 DIAGNOSIS — N30.01 ACUTE CYSTITIS WITH HEMATURIA: ICD-10-CM

## 2024-03-25 DIAGNOSIS — L03.311 CELLULITIS OF ABDOMINAL WALL: ICD-10-CM

## 2024-03-25 DIAGNOSIS — A59.9 TRICHOMONAS VAGINALIS INFECTION: ICD-10-CM

## 2024-03-25 LAB
ALBUMIN SERPL BCG-MCNC: 4.1 G/DL (ref 3.5–5.2)
ALBUMIN UR-MCNC: 30 MG/DL
ALP SERPL-CCNC: 98 U/L (ref 40–150)
ALT SERPL W P-5'-P-CCNC: 14 U/L (ref 0–50)
ANION GAP SERPL CALCULATED.3IONS-SCNC: 10 MMOL/L (ref 7–15)
APPEARANCE UR: ABNORMAL
AST SERPL W P-5'-P-CCNC: 16 U/L (ref 0–45)
BACTERIAL VAGINOSIS VAG-IMP: NEGATIVE
BASOPHILS # BLD AUTO: 0 10E3/UL (ref 0–0.2)
BASOPHILS NFR BLD AUTO: 1 %
BILIRUB SERPL-MCNC: 0.6 MG/DL
BILIRUB UR QL STRIP: NEGATIVE
BUN SERPL-MCNC: 19.1 MG/DL (ref 8–23)
CALCIUM SERPL-MCNC: 10.2 MG/DL (ref 8.8–10.2)
CANDIDA DNA VAG QL NAA+PROBE: NOT DETECTED
CANDIDA GLABRATA / CANDIDA KRUSEI DNA: NOT DETECTED
CHLORIDE SERPL-SCNC: 106 MMOL/L (ref 98–107)
COLOR UR AUTO: YELLOW
CREAT SERPL-MCNC: 1.21 MG/DL (ref 0.51–0.95)
CRP SERPL-MCNC: 12.22 MG/L
DEPRECATED HCO3 PLAS-SCNC: 26 MMOL/L (ref 22–29)
EGFRCR SERPLBLD CKD-EPI 2021: 51 ML/MIN/1.73M2
EOSINOPHIL # BLD AUTO: 0.1 10E3/UL (ref 0–0.7)
EOSINOPHIL NFR BLD AUTO: 1 %
ERYTHROCYTE [DISTWIDTH] IN BLOOD BY AUTOMATED COUNT: 12.9 % (ref 10–15)
GLUCOSE SERPL-MCNC: 81 MG/DL (ref 70–99)
GLUCOSE UR STRIP-MCNC: NEGATIVE MG/DL
HCT VFR BLD AUTO: 39 % (ref 35–47)
HGB BLD-MCNC: 12.9 G/DL (ref 11.7–15.7)
HGB UR QL STRIP: ABNORMAL
HOLD SPECIMEN: NORMAL
HOLD SPECIMEN: NORMAL
HYALINE CASTS: 4 /LPF
IMM GRANULOCYTES # BLD: 0 10E3/UL
IMM GRANULOCYTES NFR BLD: 1 %
KETONES UR STRIP-MCNC: NEGATIVE MG/DL
LACTATE SERPL-SCNC: 0.8 MMOL/L (ref 0.7–2)
LEUKOCYTE ESTERASE UR QL STRIP: ABNORMAL
LYMPHOCYTES # BLD AUTO: 1.7 10E3/UL (ref 0.8–5.3)
LYMPHOCYTES NFR BLD AUTO: 20 %
MCH RBC QN AUTO: 30 PG (ref 26.5–33)
MCHC RBC AUTO-ENTMCNC: 33.1 G/DL (ref 31.5–36.5)
MCV RBC AUTO: 91 FL (ref 78–100)
MONOCYTES # BLD AUTO: 0.6 10E3/UL (ref 0–1.3)
MONOCYTES NFR BLD AUTO: 7 %
MUCOUS THREADS #/AREA URNS LPF: PRESENT /LPF
NEUTROPHILS # BLD AUTO: 5.9 10E3/UL (ref 1.6–8.3)
NEUTROPHILS NFR BLD AUTO: 70 %
NITRATE UR QL: NEGATIVE
NRBC # BLD AUTO: 0 10E3/UL
NRBC BLD AUTO-RTO: 0 /100
PH UR STRIP: 6.5 [PH] (ref 5–9)
PLATELET # BLD AUTO: 161 10E3/UL (ref 150–450)
POTASSIUM SERPL-SCNC: 4.1 MMOL/L (ref 3.4–5.3)
PROT SERPL-MCNC: 7 G/DL (ref 6.4–8.3)
RBC # BLD AUTO: 4.3 10E6/UL (ref 3.8–5.2)
RBC URINE: 33 /HPF
SODIUM SERPL-SCNC: 142 MMOL/L (ref 135–145)
SP GR UR STRIP: 1.02 (ref 1–1.03)
SQUAMOUS EPITHELIAL: 9 /HPF
T PALLIDUM AB SER QL: NONREACTIVE
T VAGINALIS DNA VAG QL NAA+PROBE: DETECTED
UROBILINOGEN UR STRIP-MCNC: NORMAL MG/DL
WBC # BLD AUTO: 8.4 10E3/UL (ref 4–11)
WBC URINE: >182 /HPF

## 2024-03-25 PROCEDURE — 85025 COMPLETE CBC W/AUTO DIFF WBC: CPT

## 2024-03-25 PROCEDURE — 86140 C-REACTIVE PROTEIN: CPT

## 2024-03-25 PROCEDURE — 81001 URINALYSIS AUTO W/SCOPE: CPT

## 2024-03-25 PROCEDURE — 87040 BLOOD CULTURE FOR BACTERIA: CPT | Mod: 91

## 2024-03-25 PROCEDURE — 250N000013 HC RX MED GY IP 250 OP 250 PS 637

## 2024-03-25 PROCEDURE — 250N000011 HC RX IP 250 OP 636

## 2024-03-25 PROCEDURE — 99284 EMERGENCY DEPT VISIT MOD MDM: CPT

## 2024-03-25 PROCEDURE — 87086 URINE CULTURE/COLONY COUNT: CPT

## 2024-03-25 PROCEDURE — 36415 COLL VENOUS BLD VENIPUNCTURE: CPT

## 2024-03-25 PROCEDURE — 258N000003 HC RX IP 258 OP 636

## 2024-03-25 PROCEDURE — 87205 SMEAR GRAM STAIN: CPT

## 2024-03-25 PROCEDURE — 0352U MULTIPLEX VAGINAL PANEL BY PCR: CPT

## 2024-03-25 PROCEDURE — 99285 EMERGENCY DEPT VISIT HI MDM: CPT | Mod: 25

## 2024-03-25 PROCEDURE — 83605 ASSAY OF LACTIC ACID: CPT

## 2024-03-25 PROCEDURE — 74177 CT ABD & PELVIS W/CONTRAST: CPT | Mod: MG

## 2024-03-25 PROCEDURE — 96361 HYDRATE IV INFUSION ADD-ON: CPT | Mod: XU

## 2024-03-25 PROCEDURE — 82040 ASSAY OF SERUM ALBUMIN: CPT

## 2024-03-25 PROCEDURE — 96365 THER/PROPH/DIAG IV INF INIT: CPT | Mod: XU

## 2024-03-25 RX ORDER — IOPAMIDOL 755 MG/ML
123 INJECTION, SOLUTION INTRAVASCULAR ONCE
Status: COMPLETED | OUTPATIENT
Start: 2024-03-25 | End: 2024-03-25

## 2024-03-25 RX ORDER — CEFTRIAXONE 2 G/1
2 INJECTION, POWDER, FOR SOLUTION INTRAMUSCULAR; INTRAVENOUS ONCE
Status: COMPLETED | OUTPATIENT
Start: 2024-03-25 | End: 2024-03-25

## 2024-03-25 RX ORDER — METRONIDAZOLE 500 MG/1
500 TABLET ORAL 2 TIMES DAILY
Qty: 14 TABLET | Refills: 0 | Status: SHIPPED | OUTPATIENT
Start: 2024-03-25 | End: 2024-04-01

## 2024-03-25 RX ORDER — CEPHALEXIN 500 MG/1
500 CAPSULE ORAL 4 TIMES DAILY
Qty: 28 CAPSULE | Refills: 0 | Status: SHIPPED | OUTPATIENT
Start: 2024-03-25 | End: 2024-04-01

## 2024-03-25 RX ORDER — METRONIDAZOLE 500 MG/1
500 TABLET ORAL ONCE
Status: COMPLETED | OUTPATIENT
Start: 2024-03-25 | End: 2024-03-25

## 2024-03-25 RX ADMIN — METRONIDAZOLE 500 MG: 500 TABLET ORAL at 15:52

## 2024-03-25 RX ADMIN — SODIUM CHLORIDE 1000 ML: 9 INJECTION, SOLUTION INTRAVENOUS at 14:19

## 2024-03-25 RX ADMIN — IOPAMIDOL 123 ML: 755 INJECTION, SOLUTION INTRAVENOUS at 14:38

## 2024-03-25 RX ADMIN — CEFTRIAXONE 2 G: 2 INJECTION, POWDER, FOR SOLUTION INTRAMUSCULAR; INTRAVENOUS at 15:55

## 2024-03-25 ASSESSMENT — COLUMBIA-SUICIDE SEVERITY RATING SCALE - C-SSRS
2. HAVE YOU ACTUALLY HAD ANY THOUGHTS OF KILLING YOURSELF IN THE PAST MONTH?: NO
1. IN THE PAST MONTH, HAVE YOU WISHED YOU WERE DEAD OR WISHED YOU COULD GO TO SLEEP AND NOT WAKE UP?: NO
6. HAVE YOU EVER DONE ANYTHING, STARTED TO DO ANYTHING, OR PREPARED TO DO ANYTHING TO END YOUR LIFE?: NO

## 2024-03-25 ASSESSMENT — ENCOUNTER SYMPTOMS
SHORTNESS OF BREATH: 1
ABDOMINAL PAIN: 1
WOUND: 1
ACTIVITY CHANGE: 1

## 2024-03-25 ASSESSMENT — ACTIVITIES OF DAILY LIVING (ADL)
ADLS_ACUITY_SCORE: 33
ADLS_ACUITY_SCORE: 35

## 2024-03-25 NOTE — TELEPHONE ENCOUNTER
Please call the patient.  He wound / bite is much bigger now and she is feeling very lethargic.  She was told to be seen today if its not better, its not better.  Can she get worked in today with any PCP.          Brooke Edwards on 3/25/2024 at 11:14 AM

## 2024-03-25 NOTE — DISCHARGE INSTRUCTIONS
Follow up with wound care this week in clinic. Stop at the registration desk to set up appointment with Vicki for wound check.     Change the dressing if the drainage goes beyond all the borders. Otherwise change every other day. Use the ALLEVYN dressing I sent with you.     Metronidazole (antibiotic) to treat STI is twice daily x 7 days. Do not drink alcohol while taking this medication.     Continue taking the doxycycline as directed for wound infection.     Cephalexin (antibiotic) for skin infection and urinary tract infection. Take 1 capsule 4 times a day for 7 days.     Take antibiotics as prescribed. Finish full course of antibiotics, even you start feeling better.    Rest. Stay hydrated by drinking plenty of fluids.     Take over the counter Tylenol or ibuprofen to alleviate urinary pain or discomfort. Follow the recommended dosage instructions.    Return to the ED if you experience redness outside the markings, fever, vomiting and not able to keep down fluids or antibiotics.

## 2024-03-25 NOTE — PHARMACY-CONSULT NOTE
Pharmacy- Automatic Dose Adjustment    Patient Active Problem List   Diagnosis    Bipolar I disorder (H)    Severe episode of recurrent major depressive disorder, with psychotic features (H)    Mixed obsessional thoughts and acts    Paroxysmal supraventricular tachycardia    Cocaine abuse (H)    Behcet's disease (H)    Positive FIT (fecal immunochemical test)    History of Guillain-[place] syndrome    Hx of venereal warts    Hepatitis C antibody positive in blood    Palpitations    Tachycardia    Abnormal electrocardiogram    History of cocaine abuse (H)    Drug abuse, nondependent (H)    Obesity    Impulse control disorder    Primary osteoarthritis of both knees    Severe bipolar I disorder, current or most recent episode mixed (H)        Relevant Labs:  Recent Labs   Lab Test 03/25/24  1329 03/23/24  1530   WBC 8.4 10.7   HGB 12.9 13.8    191        CrCl: 60.3 mL/min    No intake or output data in the 24 hours ending 03/25/24 1547       Per Automatic Dose Adjustment Protocol, will adjust:  Ceftriaxone to 2 grams as weight is > 90 kgs      Will continue to follow and make adjustments accordingly. Thank You.    Diana Olivera [facility] ....................  3/25/2024   3:47 PM

## 2024-03-25 NOTE — ED PROVIDER NOTES
"  History     Chief Complaint   Patient presents with    Wound Infection    Vaginal Discharge    Fatigue     The history is provided by the patient and medical records.     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 Wound to left lower quadrant abdomen. Sore developed on 3/17/24. She reports the wound started out as an \"itchy\" area. She believes that the area started from a spider bite. Seen in the rapid clinic two days ago and started on doxycycline. She has had 5 doses of the Doxycycline and feels the wound is getting worse. Wound is draining pus today. Slept 20 hours yesterday. Decreased appetite. Did not eat or drink much yesterday. C/o SOB with the abdominal wound. \"Feel more tired and short of breath with exertion.\" C/o vaginal discharge. Recent sexual intercourse with a new partner. Chlamydia and gonorrheae negative two days ago. Pt is concerned as the vaginal discharge started out as clear a few days ago. Today vaginal discharge is greenish in color and large amount.     3/23/2024- Seen in rapid clinic- insect bite of abd wall-insect bite to left abdominal wall with eschar present and surrounding erythema.  Reviewed with the ER doc as this progressed significantly over the past 4 days.  Will start patient on doxycycline twice daily for 10 days.  She is been instructed that erythema should significantly improve by Monday otherwise instructed to follow-up. White count stable. Possible STI exposure- Abnormal vaginal discharge 3 days sexual intercourse. GC chlamydia negative.  HIV and treponema pending    Allergies:  Allergies   Allergen Reactions    Influenza Virus Vaccine [Influenza Virus Vaccine]      Guillain-[place] syndrome    Hydrocodone Rash " "    possible       Problem List:    Patient Active Problem List    Diagnosis Date Noted    Palpitations 01/12/2023     Priority: Medium    Tachycardia 01/12/2023     Priority: Medium    Abnormal electrocardiogram 01/12/2023     Priority: Medium    History of cocaine abuse (H) 01/12/2023     Priority: Medium    Hepatitis C antibody positive in blood 12/19/2022     Priority: Medium    Behcet's disease (H) 12/14/2022     Priority: Medium    Positive FIT (fecal immunochemical test) 12/14/2022     Priority: Medium    History of Guillain-[place] syndrome 12/14/2022     Priority: Medium     Influenza vaccine      Hx of venereal warts 12/14/2022     Priority: Medium    Bipolar I disorder (H) 11/23/2022     Priority: Medium    Severe episode of recurrent major depressive disorder, with psychotic features (H) 11/23/2022     Priority: Medium    Mixed obsessional thoughts and acts 11/23/2022     Priority: Medium    Paroxysmal supraventricular tachycardia 11/23/2022     Priority: Medium    Cocaine abuse (H) 11/23/2022     Priority: Medium    Primary osteoarthritis of both knees 10/20/2018     Priority: Medium    Impulse control disorder 06/05/2007     Priority: Medium     Formatting of this note might be different from the original. Epic      Obesity 01/20/2005     Priority: Medium     Formatting of this note might be different from the original. Estimated body mass index is 37.68 kg/(m^2) as calculated from the following:   Height as of 2/22/17: 5' 6.5\" (1.689 m).   Weight as of 5/25/17: 237 lb (107.5 kg).      Drug abuse, nondependent (H) 04/02/2003     Priority: Medium     Formatting of this note might be different from the original. History of polysubstance abuse      Severe bipolar I disorder, current or most recent episode mixed (H) 04/02/2003     Priority: Medium     Formatting of this note might be different from the original. Bipolar disorder          Past Medical History:    No past medical history on file.    Past " "Surgical History:    No past surgical history on file.    Family History:    No family history on file.    Social History:  Marital Status:  Single [1]  Social History     Tobacco Use    Smoking status: Every Day     Packs/day: .5     Types: Cigarettes    Smokeless tobacco: Former   Vaping Use    Vaping Use: Never used   Substance Use Topics    Alcohol use: Not Currently    Drug use: Not Currently     Types: \"Crack\" cocaine        Medications:    cephALEXin (KEFLEX) 500 MG capsule  metroNIDAZOLE (FLAGYL) 500 MG tablet  buPROPion (WELLBUTRIN XL) 300 MG 24 hr tablet  doxycycline hyclate (VIBRAMYCIN) 100 MG capsule  hydrOXYzine (ATARAX) 50 MG tablet  ibuprofen (ADVIL/MOTRIN) 600 MG tablet  lamoTRIgine (LAMICTAL) 25 MG tablet  multivitamin w/minerals (THERA-VIT-M) tablet  naltrexone (DEPADE/REVIA) 50 MG tablet  QUEtiapine (SEROQUEL) 100 MG tablet  sertraline (ZOLOFT) 50 MG tablet  topiramate (TOPAMAX) 25 MG tablet  VRAYLAR 1.5 MG capsule          Review of Systems   Constitutional:  Positive for activity change.   Respiratory:  Positive for shortness of breath.    Gastrointestinal:  Positive for abdominal pain.   Genitourinary:  Positive for vaginal discharge.   Skin:  Positive for wound.   All other systems reviewed and are negative.      Physical Exam   BP: (!) 158/85  Pulse: (!) 49  Temp: 97.6  F (36.4  C)  Resp: 20  SpO2: 99 %      Physical Exam  Vitals and nursing note reviewed. Exam conducted with a chaperone present.   Constitutional:       General: She is not in acute distress.     Appearance: Normal appearance. She is normal weight. She is not ill-appearing.   HENT:      Head: Normocephalic.      Right Ear: Tympanic membrane normal.      Left Ear: Tympanic membrane normal.      Nose: Nose normal.      Mouth/Throat:      Mouth: Mucous membranes are moist.      Pharynx: Oropharynx is clear. No posterior oropharyngeal erythema.   Eyes:      Conjunctiva/sclera: Conjunctivae normal.   Cardiovascular:      Rate and " Rhythm: Regular rhythm. Bradycardia present.      Pulses: Normal pulses.      Heart sounds: Normal heart sounds.   Pulmonary:      Effort: Pulmonary effort is normal.      Breath sounds: Normal breath sounds.   Abdominal:      General: Bowel sounds are normal. There is no distension.      Palpations: Abdomen is soft.      Tenderness: There is abdominal tenderness.   Genitourinary:     General: Normal vulva.      Exam position: Lithotomy position.      Vagina: Vaginal discharge present.      Cervix: Discharge present. No cervical motion tenderness.      Comments: Large amount of greenish in color discharge seen in the vaginal canal.   Musculoskeletal:      Cervical back: Normal range of motion and neck supple.   Skin:     General: Skin is warm and dry.      Capillary Refill: Capillary refill takes less than 2 seconds.      Findings: Wound present.          Neurological:      Mental Status: She is alert and oriented to person, place, and time.      GCS: GCS eye subscore is 4. GCS verbal subscore is 5. GCS motor subscore is 6.      Cranial Nerves: Cranial nerves 2-12 are intact.      Sensory: Sensation is intact.      Motor: Motor function is intact.      Coordination: Coordination is intact.   Psychiatric:         Mood and Affect: Mood is anxious.                Results for orders placed or performed during the hospital encounter of 03/25/24 (from the past 24 hour(s))   [place] Draw *Canceled*    Narrative    The following orders were created for panel order [place] Draw.  Procedure                               Abnormality         Status                     ---------                               -----------         ------                       Please view results for these tests on the individual orders.   CRP inflammation   Result Value Ref Range    CRP Inflammation 12.22 (H) <5.00 mg/L   Lactic acid whole blood   Result Value Ref Range    Lactic Acid 0.8 0.7 - 2.0 mmol/L   Comprehensive metabolic panel   Result  Value Ref Range    Sodium 142 135 - 145 mmol/L    Potassium 4.1 3.4 - 5.3 mmol/L    Carbon Dioxide (CO2) 26 22 - 29 mmol/L    Anion Gap 10 7 - 15 mmol/L    Urea Nitrogen 19.1 8.0 - 23.0 mg/dL    Creatinine 1.21 (H) 0.51 - 0.95 mg/dL    GFR Estimate 51 (L) >60 mL/min/1.73m2    Calcium 10.2 8.8 - 10.2 mg/dL    Chloride 106 98 - 107 mmol/L    Glucose 81 70 - 99 mg/dL    Alkaline Phosphatase 98 40 - 150 U/L    AST 16 0 - 45 U/L    ALT 14 0 - 50 U/L    Protein Total 7.0 6.4 - 8.3 g/dL    Albumin 4.1 3.5 - 5.2 g/dL    Bilirubin Total 0.6 <=1.2 mg/dL   CBC with platelets differential    Narrative    The following orders were created for panel order CBC with platelets differential.  Procedure                               Abnormality         Status                     ---------                               -----------         ------                     CBC with platelets and d...[[id number]]                      Final result               RBC and Platelet Morphology[[id number]]                                                   Please view results for these tests on the individual orders.   CBC with platelets and differential   Result Value Ref Range    WBC Count 8.4 4.0 - 11.0 10e3/uL    RBC Count 4.30 3.80 - 5.20 10e6/uL    Hemoglobin 12.9 11.7 - 15.7 g/dL    Hematocrit 39.0 35.0 - 47.0 %    MCV 91 78 - 100 fL    MCH 30.0 26.5 - 33.0 pg    MCHC 33.1 31.5 - 36.5 g/dL    RDW 12.9 10.0 - 15.0 %    Platelet Count 161 150 - 450 10e3/uL    % Neutrophils 70 %    % Lymphocytes 20 %    % Monocytes 7 %    % Eosinophils 1 %    % Basophils 1 %    % Immature Granulocytes 1 %    NRBCs per 100 WBC 0 <1 /100    Absolute Neutrophils 5.9 1.6 - 8.3 10e3/uL    Absolute Lymphocytes 1.7 0.8 - 5.3 10e3/uL    Absolute Monocytes 0.6 0.0 - 1.3 10e3/uL    Absolute Eosinophils 0.1 0.0 - 0.7 10e3/uL    Absolute Basophils 0.0 0.0 - 0.2 10e3/uL    Absolute Immature Granulocytes 0.0 <=0.4 10e3/uL    Absolute NRBCs 0.0 10e3/uL   [place] Draw    Narrative     The following orders were created for panel order [place] Draw.  Procedure                               Abnormality         Status                     ---------                               -----------         ------                     Extra Blue Top Tube[[id number]]                              Final result               Extra Red Top Tube[[id number]]                               Final result                 Please view results for these tests on the individual orders.   Extra Blue Top Tube   Result Value Ref Range    Hold Specimen JIC    Extra Red Top Tube   Result Value Ref Range    Hold Specimen JIC    UA with Microscopic reflex to Culture    Specimen: Urine, Midstream   Result Value Ref Range    Color Urine Yellow Colorless, Straw, Light Yellow, Yellow    Appearance Urine Slightly Cloudy (A) Clear    Glucose Urine Negative Negative mg/dL    Bilirubin Urine Negative Negative    Ketones Urine Negative Negative mg/dL    Specific Gravity Urine 1.023 1.000 - 1.030    Blood Urine Trace (A) Negative    pH Urine 6.5 5.0 - 9.0    Protein Albumin Urine 30 (A) Negative mg/dL    Urobilinogen Urine Normal Normal, 2.0 mg/dL    Nitrite Urine Negative Negative    Leukocyte Esterase Urine Large (A) Negative    Mucus Urine Present (A) None Seen /LPF    RBC Urine 33 (H) <=2 /HPF    WBC Urine >182 (H) <=5 /HPF    Squamous Epithelials Urine 9 (H) <=1 /HPF    Hyaline Casts Urine 4 (H) <=2 /LPF    Narrative    Urine Culture ordered based on laboratory criteria   Multiplex Vaginal Panel by PCR    Specimen: Vagina; Swab   Result Value Ref Range    Bacterial Vaginosis Organism DNA Negative Negative    Candida Group DNA Not Detected Not Detected    Candida glabrata / [place] krusei DNA Not Detected Not Detected    Trichomonas vaginalis DNA Detected (A) Not Detected    Narrative    The Xpert  Xpress MVP test, performed on the [employer], is an automated, qualitative in vitro diagnostic test for the detection of DNA  targets from anaerobic bacteria associated with bacterial vaginosis, Candida species associated with vulvovaginal candidiasis, and Trichomonas vaginalis. The assay uses clinician-collected and self-collected vaginal swabs from patients who are symptomatic for vaginitis/ vaginosis. The Xpert  Xpress MVP test utilizes real-time polymerase chain reaction (PCR) for the amplification of specific DNA targets and utilizes fluorogenic target-specific hybridization probes to detect and differentiate DNA. It is intended to aid in the diagnosis of vaginal infections in women with a clinical presentation consistent with bacterial vaginosis, vulvovaginal candidiasis, or trichomoniasis.   The assay targets three anaerobic microorgansims that are associated with bacterial vaginosis (BV). Other organisms that are not detected by the Xpert  Xpress MVP test have also been reported to be associated with BV. The BV organism and Candida species targets of the Xpert  Xpress MVP test can be commensal in women; positive results must be considered in conjunction with other clinical and patient information to determine the disease status.   CT Abdomen Pelvis w Contrast    Narrative    CT ABDOMEN PELVIS W CONTRAST    CLINICAL HISTORY: Female, age 58 years,  cellulitis LLQ abdomen.  Worsening abscess? necrotizing?;    Comparison:  No relevant prior imaging.    TECHNIQUE:  CT scanwas performed of the abdomen and pelvis with IV  contrast. Axial, sagittal and coronal images were reviewed. If  present, MIP and/or 3-D images were constructed by the technologist.    FINDINGS:  The lung bases and visualized portions of the heart demonstrate no  acute abnormality. Peripheral areas of atelectasis are seen within the  lung bases.    Stomach and duodenum: Unremarkable.    Liver: Localized decrease in density suggesting hepatic steatosis  adjacent to the falciform ligament.    Gallbladder: Unremarkable.    Spleen: There is a 5 mm low-density subcapsular  focus that is too  small to characterize.    Pancreas: Unremarkable.    Adrenal glands: Unremarkable.    Kidneys: Unremarkable. Proximal ureters: Unremarkable.    Urinary bladder: Unremarkable.    Large and small bowel: There are a few diverticula of the colon. No  evidence of diverticulitis or other acute abnormality. Small bowel  loops are nondistended and unremarkable in appearance. The appendix is  unremarkable.    The uterus is atrophic and unremarkable. Abdominal aorta and inferior  vena cava demonstrate no acute abnormality. There are normal-sized  lymph nodes throughout the retroperitoneum. A few borderline enlarged  lymph nodes are seen in the left groin.    There is a 5.5 transverse by 4.5 cm craniocaudal area of skin  thickening in the left lower quadrant. The underlying fat is mildly  edematous suggesting localized cellulitis without evidence of a  subcutaneous abscess. The skin is thickened and may contain some  fluid.    No evidence of ventral wall hernia.      Impression    IMPRESSION:   Localized skin thickening and mild inflammation in the subcutaneous  fat suggesting localized cellulitis without evidence of a large  subcutaneous abscess.    The thickened skin does appear to be slightly low dense and may  contain a small fluid collection versus fluid collections. This  decrease in density may also represent a poor perfusion of the skin.    No evidence of acute intraperitoneal or retroperitoneal abnormality.      This facility minimizes radiation dose by adjusting the mA and/or kV  according to each patient size.      This CT scan was performed using one or more the following dose  reduction techniques:    -Automated exposure control,  -Adjustment of the mA and/or kV according to patient's size, and/or,  -Use of iterative reconstruction technique.    PRETTY DAMON MD         SYSTEM ID:  [id number]       Medications   sodium chloride 0.9% BOLUS 1,000 mL (0 mLs Intravenous Stopped 3/25/24 7813)  "  iopamidol (ISOVUE-370) solution 123 mL (123 mLs Intravenous $Given 3/25/24 1438)   metroNIDAZOLE (FLAGYL) tablet 500 mg (500 mg Oral $Given 3/25/24 1552)   cefTRIAXone (ROCEPHIN) 2 g vial to attach to  ml bag for ADULTS or NS 50 ml bag for PEDS (0 g Intravenous Stopped 3/25/24 1631)       Assessments & Plan (with Medical Decision Making)  aGy Romero is a 58 year old female with a PMH bipolar disorder I, MDD, paroxysmal supraventricular tachycardia, hx guillain-[place] syndrome, 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 Wound to left lower quadrant abdomen. Sore developed on 3/17/24. She reports the wound started out as an \"itchy\" area. She believes that the area started from a spider bite. Seen in the rapid clinic two days ago and started on doxycycline. She has had 5 doses of the Doxycycline and feels the wound is getting worse. Wound is draining pus today. Slept 20 hours yesterday. Decreased appetite. Did not eat or drink much yesterday. C/o SOB with the abdominal wound. \"Feel more tired and short of breath with exertion.\" C/o vaginal discharge. Recent sexual intercourse with a new partner. Chlamydia and gonorrheae negative two days ago. Pt is concerned as the vaginal discharge started out as clear a few days ago. Today vaginal discharge is greenish in color and large amount.   3/23/2024- Seen in rapid clinic- insect bite of abd wall-insect bite to left abdominal wall with eschar present and surrounding erythema.  Reviewed with the ER doc as this progressed significantly over the past 4 days.  Will start patient on doxycycline twice daily for 10 days.  She is been instructed that erythema should significantly improve by Monday otherwise instructed to follow-up. White count stable. Possible STI exposure- Abnormal vaginal discharge 3 days sexual intercourse. GC chlamydia negative. " HIV and treponema pending  VS in the ED. BP (!) 173/79   Pulse 50   Temp 97.6  F (36.4  C) (Oral)   Resp 20   SpO2 99% Gave IVF, metronidazole, and Ceftriaxone. LLQ abdominal wall erythema with a 5.5 cm x 3 cm center with eschar border with sloughing. Moderate amount of serosanguinous drainage coming from the center of the wound. Outlined the erythema with a skin marker. Vaginal exam reveals copious amounts of greenish discharge in the vaginal canal.   DDx: Differentials considered but not limited to cellulitis, complicated soft tissue infection- possibly necrotizing fascitis?, abscess, UTI, BV, Trichomonas, yeast infection, GC/chlamydia (labs, CT pending).   Diagnostics:    Lab: CBC- normal. Lactic acid- normal. CRP 12.22- slightly elevated. No prior to compare. CMP- creat 1.21, GFR 51- stable. UTI- implies UTI. UC- pending. Blood cultures pending. Multiplex vaginal panel- positive trichomonas vaginalis. Wound bacterial culture with gram stain- pending.     CT scan: I independently reviewed CT abd/pelvis w/contrast and then confirmed by radiology findings noted There is a 5.5 transverse by 4.5 cm craniocaudal area of skin thickening in the left lower quadrant. The underlying fat is mildly edematous suggesting localized cellulitis without evidence of a subcutaneous abscess. The skin is thickened and may contain some fluid.     Discussed lab and CT findings with the pt. CT abd/pelvis negative for complicated soft tissue concern. Negative for subcutaneous abscess. Whether this wound is from a spider bite the treatment is the same. Redness is improving from two days ago. No leukocytosis. CRP is elevated. Wound is now draining. Wound culture pending. Will continue doxycycline. Applied ALLEVYN dressing to the open wound. Dressings sent home with the pt. Discussed changing dressing every other day. Follow up appointment with Cesario WEBBER on 3/27/24. UA implies UTI with hematuria. IV ceftriaxone given. BC and UC  pending. Previous UC (10/16/23) revealed E. Coli- pan sensitive. Prescribed Cephalexin for UTI and will cover for soft tissue infection. Vaginal panel positive for trichomonas. Pelvic exam negative for cervical motion tenderness. Prescribed metronidazole. After shared decision making, the patient is comfortable with discharging home. Discussed discharge plan. Verbalizes understanding of discharge plan.      I have reviewed the nursing notes.    I have reviewed the findings, diagnosis, plan and need for follow up with the patient.  Medical Decision Making  The patient's presentation was of low complexity (2+ clearly self-limited or minor problems).    The patient's evaluation involved:  an assessment requiring an independent historian (see separate area of note for details)  review of external note(s) from 3+ sources (see separate area of note for details)  review of 3+ test result(s) ordered prior to this encounter (see separate area of note for details)  ordering and/or review of 3+ test(s) in this encounter (see separate area of note for details)    The patient's management necessitated moderate risk (prescription drug management including medications given in the ED).      Discharge Medication List as of 3/25/2024  4:39 PM        START taking these medications    Details   cephALEXin (KEFLEX) 500 MG capsule Take 1 capsule (500 mg) by mouth 4 times daily for 7 days, Disp-28 capsule, R-0, E-Prescribe      metroNIDAZOLE (FLAGYL) 500 MG tablet Take 1 tablet (500 mg) by mouth 2 times daily for 7 days, Disp-14 tablet, R-0, E-Prescribe             Final diagnoses:   Acute cystitis with hematuria   Trichomonas vaginalis infection   Cellulitis of abdominal wall - wound LLQ abdomen        3/25/2024   [facility], APRN CNP  03/25/24 2338

## 2024-03-25 NOTE — ED TRIAGE NOTES
Pt arrives via private car with complaints of infected abd wound, severe lethargy and abnormal vaginal discharge. Pt states she started to notice the spot on her left abd on the 15th of March and overtime it started to grow. She thought she may have been bit by a spider. Pt was seen in rapid clinic on Saturday and was sent home with doxycycline. Today the wound is larger and more irritated, pt has been very lethargic and slept over 20 hours yesterday. She also notes she has pus-like vaginal discharge and wonders if the two are related.     Triage Assessment (Adult)       Row Name 03/25/24 1240          Triage Assessment    Airway WDL WDL        Respiratory WDL    Respiratory WDL X;rhythm/pattern     Rhythm/Pattern, Respiratory shortness of breath        Skin Circulation/Temperature WDL    Skin Circulation/Temperature WDL WDL        Cardiac WDL    Cardiac WDL X;rhythm     Pulse Rate & Regularity bradycardic        Peripheral/Neurovascular WDL    Peripheral Neurovascular WDL WDL        Cognitive/Neuro/Behavioral WDL    Cognitive/Neuro/Behavioral WDL WDL

## 2024-03-25 NOTE — TELEPHONE ENCOUNTER
Called and spoke with patient and patient states that the wound she had when she was seen on Saturday in [facility] has gotten much worse.    There are not available appointments in clinic patient advised to return to [facility] or [facility].  Called patient back after review of visit on 3/23/24 and patient advised to present to ER and patient verbalized understanding.    Manda Grayson RN on 3/25/2024 at 11:28 AM

## 2024-03-27 ENCOUNTER — OFFICE VISIT (OUTPATIENT)
Dept: INTERNAL MEDICINE | Facility: OTHER | Age: 59
End: 2024-03-27
Attending: NURSE PRACTITIONER
Payer: MEDICARE

## 2024-03-27 VITALS
HEIGHT: 68 IN | WEIGHT: 217.6 LBS | BODY MASS INDEX: 32.98 KG/M2 | RESPIRATION RATE: 16 BRPM | HEART RATE: 47 BPM | OXYGEN SATURATION: 97 % | DIASTOLIC BLOOD PRESSURE: 88 MMHG | TEMPERATURE: 97.6 F | SYSTOLIC BLOOD PRESSURE: 132 MMHG

## 2024-03-27 DIAGNOSIS — A59.9 TRICHIMONIASIS: ICD-10-CM

## 2024-03-27 DIAGNOSIS — M35.2 BEHCET'S DISEASE (H): ICD-10-CM

## 2024-03-27 DIAGNOSIS — F31.63 SEVERE BIPOLAR I DISORDER, CURRENT OR MOST RECENT EPISODE MIXED (H): ICD-10-CM

## 2024-03-27 DIAGNOSIS — L98.492 SKIN ULCER WITH FAT LAYER EXPOSED (H): Primary | ICD-10-CM

## 2024-03-27 DIAGNOSIS — L03.311 CELLULITIS OF ABDOMINAL WALL: ICD-10-CM

## 2024-03-27 DIAGNOSIS — Z23 NEED FOR TETANUS, DIPHTHERIA, AND ACELLULAR PERTUSSIS (TDAP) VACCINE: ICD-10-CM

## 2024-03-27 DIAGNOSIS — Z86.69 HISTORY OF GUILLAIN-BARRE SYNDROME: ICD-10-CM

## 2024-03-27 LAB
BACTERIA UR CULT: NORMAL
BACTERIA WND CULT: ABNORMAL
GRAM STAIN RESULT: ABNORMAL

## 2024-03-27 PROCEDURE — 90715 TDAP VACCINE 7 YRS/> IM: CPT

## 2024-03-27 PROCEDURE — 97597 DBRDMT OPN WND 1ST 20 CM/<: CPT | Performed by: NURSE PRACTITIONER

## 2024-03-27 PROCEDURE — 99215 OFFICE O/P EST HI 40 MIN: CPT | Mod: 25 | Performed by: NURSE PRACTITIONER

## 2024-03-27 PROCEDURE — G0463 HOSPITAL OUTPT CLINIC VISIT: HCPCS | Mod: 25

## 2024-03-27 ASSESSMENT — PAIN SCALES - GENERAL: PAINLEVEL: MODERATE PAIN (4)

## 2024-03-27 NOTE — RESULT ENCOUNTER NOTE
Final urine culture report is negative.  Adult: Negative urine culture parameters per protocol: Any # urogenital lashell, single or mixed   [facility] Emergency Dept discharge antibiotic prescribed (If applicable): Cephalexin and Doxycycline  Treatment recommendations per [facility] ED Lab Result Urine Culture protocol: No change in plan of care.

## 2024-03-30 LAB
BACTERIA BLD CULT: NO GROWTH
BACTERIA BLD CULT: NO GROWTH

## 2024-04-04 ENCOUNTER — OFFICE VISIT (OUTPATIENT)
Dept: INTERNAL MEDICINE | Facility: OTHER | Age: 59
End: 2024-04-04
Attending: NURSE PRACTITIONER
Payer: MEDICARE

## 2024-04-04 VITALS
DIASTOLIC BLOOD PRESSURE: 80 MMHG | OXYGEN SATURATION: 98 % | RESPIRATION RATE: 18 BRPM | WEIGHT: 218.2 LBS | TEMPERATURE: 97.2 F | HEART RATE: 45 BPM | BODY MASS INDEX: 33.18 KG/M2 | SYSTOLIC BLOOD PRESSURE: 134 MMHG

## 2024-04-04 DIAGNOSIS — L98.492 SKIN ULCER WITH FAT LAYER EXPOSED (H): Primary | ICD-10-CM

## 2024-04-04 DIAGNOSIS — L03.311 CELLULITIS OF ABDOMINAL WALL: ICD-10-CM

## 2024-04-04 PROCEDURE — 99213 OFFICE O/P EST LOW 20 MIN: CPT | Performed by: NURSE PRACTITIONER

## 2024-04-04 PROCEDURE — G0463 HOSPITAL OUTPT CLINIC VISIT: HCPCS

## 2024-04-04 ASSESSMENT — PATIENT HEALTH QUESTIONNAIRE - PHQ9
SUM OF ALL RESPONSES TO PHQ QUESTIONS 1-9: 6
SUM OF ALL RESPONSES TO PHQ QUESTIONS 1-9: 6
10. IF YOU CHECKED OFF ANY PROBLEMS, HOW DIFFICULT HAVE THESE PROBLEMS MADE IT FOR YOU TO DO YOUR WORK, TAKE CARE OF THINGS AT HOME, OR GET ALONG WITH OTHER PEOPLE: SOMEWHAT DIFFICULT

## 2024-04-04 ASSESSMENT — PAIN SCALES - GENERAL: PAINLEVEL: NO PAIN (0)

## 2024-04-04 NOTE — NURSING NOTE
"Chief Complaint   Patient presents with    WOUND CARE     Left side abdomin   wound check       Medication reconciliation completed.    FOOD SECURITY SCREENING QUESTIONS:    The next two questions are to help us understand your food security.  If you are feeling you need any assistance in this area, we have resources available to support you today.    Hunger Vital Signs:  Within the past 12 months we worried whether our food would run out before we got money to buy more. Never  Within the past 12 months the food we bought just didn't last and we didn't have money to get more. Never    Initial /80 (BP Location: Right arm, Patient Position: Sitting, Cuff Size: Adult Regular)   Pulse (!) 45   Temp 97.2  F (36.2  C) (Temporal)   Resp 18   Wt 99 kg (218 lb 3.2 oz)   SpO2 98%   Breastfeeding No   BMI 33.18 kg/m   Estimated body mass index is 33.18 kg/m  as calculated from the following:    Height as of 3/27/24: 1.727 m (5' 8\").    Weight as of this encounter: 99 kg (218 lb 3.2 oz).       Tanisha Zamarripa LPN .......  4/4/2024  10:49 AM    "

## 2024-04-04 NOTE — PROGRESS NOTES
[facility] PROGRESS NOTE    Assessment:     ICD-10-CM    1. Skin ulcer with fat layer exposed (H)  L98.492 Wound Care Order for DME - ONLY FOR DME      2. Cellulitis of abdominal wall  L03.311           Plan:   Dressing change every other day.  Cleanse with saline spray, cut Maxorb Ag to fit size of wound and placed into wound followed by Allevyn gentle border light dressing.  Patient was sent home with the remainder of Maxorb AG and also provided with 2 Allevyn gentle border light dressings.  Prescription sent to [facility] for wound care supplies.  Follow up in wound clinic in 2 weeks.  Monitor closely for any s/sx of wound worsening or evidence of infection as reviewed and discussed at visit.  Follow up urgently with any worsening or infection concerns.    Subjective:  Patient here today to follow up on skin ulcer on abdomen and staff aureus cellulitis.    She has completed antibiotic course.  She is doing well.  She believes the wound is also getting smaller.    No past medical history on file.  No past surgical history on file.  Influenza virus vaccine [influenza virus vaccine] and Hydrocodone  Current Outpatient Medications   Medication Sig Dispense Refill    buPROPion (WELLBUTRIN XL) 300 MG 24 hr tablet Take 1 tablet by mouth daily at 2 pm      hydrOXYzine (ATARAX) 50 MG tablet Take 50 mg by mouth 3 times daily as needed      ibuprofen (ADVIL/MOTRIN) 600 MG tablet Take 600 mg by mouth 3 times daily      lamoTRIgine (LAMICTAL) 25 MG tablet Take 25 mg by mouth At Bedtime      multivitamin w/minerals (THERA-VIT-M) tablet Take 1 tablet by mouth daily      naltrexone (DEPADE/REVIA) 50 MG tablet Take 50 mg by mouth daily      QUEtiapine (SEROQUEL) 100 MG tablet Take 100 mg by mouth      sertraline (ZOLOFT) 50 MG tablet Take 1 tablet by mouth daily at 2 pm      topiramate (TOPAMAX) 25 MG tablet Take 25 mg by mouth 2 times daily      VRAYLAR 1.5 MG capsule        No current facility-administered medications  for this visit.       Review of Systems:  Denies fever, chills, odorous and purulent drainage, surrounding redness and warmth    Objective:   /80 (BP Location: Right arm, Patient Position: Sitting, Cuff Size: Adult Regular)   Pulse (!) 45   Temp 97.2  F (36.2  C) (Temporal)   Resp 18   Wt 99 kg (218 lb 3.2 oz)   SpO2 98%   Breastfeeding No   BMI 33.18 kg/m    Physical Exam     Pleasant female no acute distress.  Affect normal.  Alert and oriented x 4.    Wound Type:  Skin ulcer  Location:  Abdomen  Wound Measurements:  1.9 cm x 3 cm x less than 0.1 cm  Wound Base:  90% granulation tissue, 10% loosely adherent slough  Undermining:  None  Tunneling:  None  Drainage:  Moderate serosanguineous  Periwound Tissue:  No surrounding erythema, warmth or maceration.  Abdominal erythema from previous cellulitis has resolved  Debridement:  Mechanical and autolytic; Anasept moistened gauze used to debride loosely adherent slough from wound bed  Treatment:  Wound irrigated with Anasept, Maxorb AG cut to size and placed into wound followed by Allevyn gentle border light dressing      INES Rivera   4/4/2024  11:03 AM

## 2024-04-18 ENCOUNTER — TELEPHONE (OUTPATIENT)
Dept: MEDSURG UNIT | Facility: OTHER | Age: 59
End: 2024-04-18
Payer: MEDICARE

## 2024-04-18 NOTE — TELEPHONE ENCOUNTER
Returning missed call and wants to speak to Shereen .      Gretta Martinez on 4/18/2024 at 3:19 PM

## 2024-04-18 NOTE — TELEPHONE ENCOUNTER
Wondering if she can see Vicki on 4/22/2024. Vicki told the patient to get a hold of her nurse to fit her in.   Has another appointment between 10 and 11 that day.  Please advise.

## 2024-04-22 ENCOUNTER — OFFICE VISIT (OUTPATIENT)
Dept: INTERNAL MEDICINE | Facility: OTHER | Age: 59
End: 2024-04-22
Attending: NURSE PRACTITIONER
Payer: MEDICARE

## 2024-04-22 VITALS
DIASTOLIC BLOOD PRESSURE: 70 MMHG | OXYGEN SATURATION: 97 % | WEIGHT: 219.6 LBS | TEMPERATURE: 98 F | RESPIRATION RATE: 16 BRPM | BODY MASS INDEX: 33.39 KG/M2 | SYSTOLIC BLOOD PRESSURE: 120 MMHG | HEART RATE: 64 BPM

## 2024-04-22 DIAGNOSIS — L98.492 SKIN ULCER WITH FAT LAYER EXPOSED (H): Primary | ICD-10-CM

## 2024-04-22 DIAGNOSIS — L03.311 CELLULITIS OF ABDOMINAL WALL: ICD-10-CM

## 2024-04-22 PROCEDURE — G0463 HOSPITAL OUTPT CLINIC VISIT: HCPCS | Mod: 25

## 2024-04-22 PROCEDURE — G0463 HOSPITAL OUTPT CLINIC VISIT: HCPCS

## 2024-04-22 PROCEDURE — 99213 OFFICE O/P EST LOW 20 MIN: CPT | Performed by: NURSE PRACTITIONER

## 2024-04-22 ASSESSMENT — PAIN SCALES - GENERAL: PAINLEVEL: NO PAIN (0)

## 2024-04-22 NOTE — NURSING NOTE
"Chief Complaint   Patient presents with    WOUND CARE       FOOD SECURITY SCREENING QUESTIONS  Hunger Vital Signs:  Within the past 12 months we worried whether our food would run out before we got money to buy more. Never  Within the past 12 months the food we bought just didn't last and we didn't have money to get more. Never  Shereen Clements LPN 4/22/2024 1:20 PM      Initial /70 (BP Location: Right arm, Patient Position: Sitting, Cuff Size: Adult Large)   Pulse 64   Temp 98  F (36.7  C) (Tympanic)   Resp 16   Wt 99.6 kg (219 lb 9.6 oz)   SpO2 97%   BMI 33.39 kg/m   Estimated body mass index is 33.39 kg/m  as calculated from the following:    Height as of 3/27/24: 1.727 m (5' 8\").    Weight as of this encounter: 99.6 kg (219 lb 9.6 oz).  Medication Reconciliation: complete    Shereen Clements LPN    "

## 2024-04-22 NOTE — PROGRESS NOTES
[facility] PROGRESS NOTE    Assessment:     ICD-10-CM    1. Skin ulcer with fat layer exposed (H)  L98.492       2. Cellulitis of abdominal wall  L03.311           Plan:   Dressing change every other day.  Cleanse with saline spray, cut Maxorb Ag to fit size of wound and placed into wound followed by Allevyn gentle border light dressing.    Follow up in wound clinic in 3 weeks.  Monitor closely for any s/sx of wound worsening or evidence of infection as reviewed and discussed at visit.  Follow up urgently with any worsening or infection concerns.  Patient is due for Medicare wellness visit.  Offered to do this at follow-up visit in 3 weeks however patient declines.    Subjective:  Patient here today to follow up on skin ulcer on abdomen and staff aureus cellulitis.    She denies evidence of return of cellulitis.  She believes that the skin ulcer is improving.    No past medical history on file.  No past surgical history on file.  Influenza virus vaccine [influenza virus vaccine] and Hydrocodone  Current Outpatient Medications   Medication Sig Dispense Refill    buPROPion (WELLBUTRIN XL) 300 MG 24 hr tablet Take 1 tablet by mouth daily at 2 pm      hydrOXYzine (ATARAX) 50 MG tablet Take 50 mg by mouth 3 times daily as needed      ibuprofen (ADVIL/MOTRIN) 600 MG tablet Take 600 mg by mouth 3 times daily      lamoTRIgine (LAMICTAL) 25 MG tablet Take 25 mg by mouth At Bedtime      multivitamin w/minerals (THERA-VIT-M) tablet Take 1 tablet by mouth daily      naltrexone (DEPADE/REVIA) 50 MG tablet Take 50 mg by mouth daily      QUEtiapine (SEROQUEL) 100 MG tablet Take 100 mg by mouth      sertraline (ZOLOFT) 50 MG tablet Take 1 tablet by mouth daily at 2 pm      topiramate (TOPAMAX) 25 MG tablet Take 25 mg by mouth 2 times daily      VRAYLAR 1.5 MG capsule        No current facility-administered medications for this visit.       Review of Systems:  Denies fever, chills, odorous and purulent drainage, surrounding redness  and warmth    Objective:   /70 (BP Location: Right arm, Patient Position: Sitting, Cuff Size: Adult Large)   Pulse 64   Temp 98  F (36.7  C) (Tympanic)   Resp 16   Wt 99.6 kg (219 lb 9.6 oz)   SpO2 97%   BMI 33.39 kg/m    Physical Exam     Pleasant female no acute distress.  Affect normal.  Alert and oriented x 4.    Wound Type:  Skin ulcer  Location:  Abdomen  Wound Measurements:  1.4 cm x 3 cm; Wound flush to the skin  Wound Base:  100% granulation tissue  Undermining:  None  Tunneling:  None  Drainage:  Moderate serosanguineous  Periwound Tissue:  No surrounding erythema, warmth or maceration.  Debridement:  Autolytic  Treatment:  Wound irrigated with Anasept, Maxorb AG cut to size and placed into wound followed by Allevyn gentle border light dressing      INES Rivera

## 2024-06-08 ENCOUNTER — HEALTH MAINTENANCE LETTER (OUTPATIENT)
Age: 59
End: 2024-06-08

## 2024-10-10 ENCOUNTER — OFFICE VISIT (OUTPATIENT)
Dept: FAMILY MEDICINE | Facility: OTHER | Age: 59
End: 2024-10-10
Attending: STUDENT IN AN ORGANIZED HEALTH CARE EDUCATION/TRAINING PROGRAM
Payer: MEDICARE

## 2024-10-10 VITALS
DIASTOLIC BLOOD PRESSURE: 82 MMHG | TEMPERATURE: 97.4 F | RESPIRATION RATE: 16 BRPM | BODY MASS INDEX: 32.36 KG/M2 | WEIGHT: 213.5 LBS | OXYGEN SATURATION: 98 % | HEART RATE: 47 BPM | SYSTOLIC BLOOD PRESSURE: 138 MMHG | HEIGHT: 68 IN

## 2024-10-10 DIAGNOSIS — N30.90 CYSTITIS: ICD-10-CM

## 2024-10-10 DIAGNOSIS — N89.8 VAGINAL DISCHARGE: ICD-10-CM

## 2024-10-10 DIAGNOSIS — F31.9 BIPOLAR I DISORDER (H): ICD-10-CM

## 2024-10-10 DIAGNOSIS — R35.0 URINARY FREQUENCY: Primary | ICD-10-CM

## 2024-10-10 LAB
ALBUMIN UR-MCNC: 30 MG/DL
APPEARANCE UR: ABNORMAL
BACTERIA #/AREA URNS HPF: ABNORMAL /HPF
BACTERIAL VAGINOSIS VAG-IMP: NEGATIVE
BILIRUB UR QL STRIP: NEGATIVE
C TRACH DNA SPEC QL PROBE+SIG AMP: NEGATIVE
CANDIDA DNA VAG QL NAA+PROBE: NOT DETECTED
CANDIDA GLABRATA / CANDIDA KRUSEI DNA: NOT DETECTED
COLOR UR AUTO: YELLOW
GLUCOSE UR STRIP-MCNC: NEGATIVE MG/DL
HGB UR QL STRIP: NEGATIVE
KETONES UR STRIP-MCNC: NEGATIVE MG/DL
LEUKOCYTE ESTERASE UR QL STRIP: ABNORMAL
MUCOUS THREADS #/AREA URNS LPF: PRESENT /LPF
N GONORRHOEA DNA SPEC QL NAA+PROBE: NEGATIVE
NITRATE UR QL: POSITIVE
PH UR STRIP: 6 [PH] (ref 5–9)
RBC URINE: 15 /HPF
SP GR UR STRIP: 1.03 (ref 1–1.03)
SQUAMOUS EPITHELIAL: 2 /HPF
T VAGINALIS DNA VAG QL NAA+PROBE: DETECTED
UROBILINOGEN UR STRIP-MCNC: NORMAL MG/DL
WBC URINE: >182 /HPF

## 2024-10-10 PROCEDURE — 81001 URINALYSIS AUTO W/SCOPE: CPT | Mod: ZL | Performed by: STUDENT IN AN ORGANIZED HEALTH CARE EDUCATION/TRAINING PROGRAM

## 2024-10-10 PROCEDURE — 87491 CHLMYD TRACH DNA AMP PROBE: CPT | Mod: ZL | Performed by: STUDENT IN AN ORGANIZED HEALTH CARE EDUCATION/TRAINING PROGRAM

## 2024-10-10 PROCEDURE — 99214 OFFICE O/P EST MOD 30 MIN: CPT | Performed by: STUDENT IN AN ORGANIZED HEALTH CARE EDUCATION/TRAINING PROGRAM

## 2024-10-10 PROCEDURE — 87186 SC STD MICRODIL/AGAR DIL: CPT | Mod: ZL | Performed by: STUDENT IN AN ORGANIZED HEALTH CARE EDUCATION/TRAINING PROGRAM

## 2024-10-10 PROCEDURE — G0463 HOSPITAL OUTPT CLINIC VISIT: HCPCS

## 2024-10-10 PROCEDURE — 0352U MULTIPLEX VAGINAL PANEL BY PCR: CPT | Mod: ZL | Performed by: STUDENT IN AN ORGANIZED HEALTH CARE EDUCATION/TRAINING PROGRAM

## 2024-10-10 RX ORDER — SULFAMETHOXAZOLE AND TRIMETHOPRIM 800; 160 MG/1; MG/1
1 TABLET ORAL 2 TIMES DAILY
Qty: 6 TABLET | Refills: 0 | Status: SHIPPED | OUTPATIENT
Start: 2024-10-10 | End: 2024-10-22 | Stop reason: ALTCHOICE

## 2024-10-10 RX ORDER — SULFAMETHOXAZOLE AND TRIMETHOPRIM 800; 160 MG/1; MG/1
1 TABLET ORAL 2 TIMES DAILY
Qty: 6 TABLET | Refills: 0 | Status: SHIPPED | OUTPATIENT
Start: 2024-10-10 | End: 2024-10-10

## 2024-10-10 ASSESSMENT — ANXIETY QUESTIONNAIRES
IF YOU CHECKED OFF ANY PROBLEMS ON THIS QUESTIONNAIRE, HOW DIFFICULT HAVE THESE PROBLEMS MADE IT FOR YOU TO DO YOUR WORK, TAKE CARE OF THINGS AT HOME, OR GET ALONG WITH OTHER PEOPLE: VERY DIFFICULT
6. BECOMING EASILY ANNOYED OR IRRITABLE: SEVERAL DAYS
4. TROUBLE RELAXING: MORE THAN HALF THE DAYS
1. FEELING NERVOUS, ANXIOUS, OR ON EDGE: MORE THAN HALF THE DAYS
7. FEELING AFRAID AS IF SOMETHING AWFUL MIGHT HAPPEN: NOT AT ALL
7. FEELING AFRAID AS IF SOMETHING AWFUL MIGHT HAPPEN: NOT AT ALL
GAD7 TOTAL SCORE: 10
5. BEING SO RESTLESS THAT IT IS HARD TO SIT STILL: SEVERAL DAYS
2. NOT BEING ABLE TO STOP OR CONTROL WORRYING: MORE THAN HALF THE DAYS
GAD7 TOTAL SCORE: 10
GAD7 TOTAL SCORE: 10
3. WORRYING TOO MUCH ABOUT DIFFERENT THINGS: MORE THAN HALF THE DAYS
8. IF YOU CHECKED OFF ANY PROBLEMS, HOW DIFFICULT HAVE THESE MADE IT FOR YOU TO DO YOUR WORK, TAKE CARE OF THINGS AT HOME, OR GET ALONG WITH OTHER PEOPLE?: VERY DIFFICULT

## 2024-10-10 ASSESSMENT — PAIN SCALES - GENERAL: PAINLEVEL: MODERATE PAIN (5)

## 2024-10-10 NOTE — NURSING NOTE
"Chief Complaint   Patient presents with    Vaginal Problem     Burning with urination, discoloration, discharge       Initial /82 (BP Location: Right arm, Patient Position: Sitting, Cuff Size: Adult Large)   Pulse (!) 47   Temp 97.4  F (36.3  C) (Tympanic)   Resp 16   Ht 1.727 m (5' 8\")   Wt 96.8 kg (213 lb 8 oz)   SpO2 98%   BMI 32.46 kg/m   Estimated body mass index is 32.46 kg/m  as calculated from the following:    Height as of this encounter: 1.727 m (5' 8\").    Weight as of this encounter: 96.8 kg (213 lb 8 oz).  Medication Review: complete    The next two questions are to help us understand your food security.  If you are feeling you need any assistance in this area, we have resources available to support you today.          1/3/2024   SDOH- Food Insecurity   Within the past 12 months, did you worry that your food would run out before you got money to buy more? N   Within the past 12 months, did the food you bought just not last and you didn t have money to get more? N            Health Care Directive:  Patient does not have a Health Care Directive or Living Will: Discussed advance care planning with patient; however, patient declined at this time.    Xuan Somers LPN      "
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
## 2024-10-12 LAB — BACTERIA UR CULT: ABNORMAL

## 2024-10-14 RX ORDER — CEFUROXIME AXETIL 250 MG/1
250 TABLET ORAL 2 TIMES DAILY
Qty: 14 TABLET | Refills: 0 | Status: SHIPPED | OUTPATIENT
Start: 2024-10-14 | End: 2024-10-21

## 2024-10-22 ENCOUNTER — OFFICE VISIT (OUTPATIENT)
Dept: FAMILY MEDICINE | Facility: OTHER | Age: 59
End: 2024-10-22
Attending: FAMILY MEDICINE
Payer: MEDICARE

## 2024-10-22 VITALS
RESPIRATION RATE: 18 BRPM | SYSTOLIC BLOOD PRESSURE: 124 MMHG | DIASTOLIC BLOOD PRESSURE: 86 MMHG | HEIGHT: 68 IN | WEIGHT: 214.6 LBS | OXYGEN SATURATION: 99 % | HEART RATE: 60 BPM | BODY MASS INDEX: 32.52 KG/M2

## 2024-10-22 DIAGNOSIS — M25.562 CHRONIC PAIN OF LEFT KNEE: ICD-10-CM

## 2024-10-22 DIAGNOSIS — G89.29 CHRONIC PAIN OF RIGHT KNEE: ICD-10-CM

## 2024-10-22 DIAGNOSIS — M25.561 CHRONIC PAIN OF RIGHT KNEE: ICD-10-CM

## 2024-10-22 DIAGNOSIS — G89.29 CHRONIC PAIN OF LEFT KNEE: ICD-10-CM

## 2024-10-22 DIAGNOSIS — A59.9 TRICHOMONIASIS: ICD-10-CM

## 2024-10-22 DIAGNOSIS — N30.00 ACUTE CYSTITIS WITHOUT HEMATURIA: Primary | ICD-10-CM

## 2024-10-22 PROCEDURE — G0463 HOSPITAL OUTPT CLINIC VISIT: HCPCS

## 2024-10-22 PROCEDURE — 99214 OFFICE O/P EST MOD 30 MIN: CPT | Performed by: FAMILY MEDICINE

## 2024-10-22 RX ORDER — METRONIDAZOLE 500 MG/1
500 TABLET ORAL 2 TIMES DAILY
Qty: 14 TABLET | Refills: 0 | Status: SHIPPED | OUTPATIENT
Start: 2024-10-22 | End: 2024-10-29

## 2024-10-22 ASSESSMENT — ENCOUNTER SYMPTOMS: FATIGUE: 1

## 2024-10-22 ASSESSMENT — PAIN SCALES - GENERAL: PAINLEVEL: MODERATE PAIN (4)

## 2024-10-22 NOTE — PROGRESS NOTES
"  Assessment & Plan     Acute cystitis without hematuria  Go ahead and start the cefuroxime and stop the Bactrim.  Follow-up if worsening or not resolving with treatment.  She knows the importance of continuing to follow along with her mental health professional for ongoing management and getting back on her regular medications with appointment tomorrow.  She will follow-up with PCP if anything else worsens or changes as far as the fatigue or other new symptoms but reassurance given that no objective weakness and previous negative workup.    Trichomoniasis  Flagyl 500 mg twice daily for 7 days.  Follow-up if any problems or continued symptoms.  - metroNIDAZOLE (FLAGYL) 500 MG tablet; Take 1 tablet (500 mg) by mouth 2 times daily for 7 days.    Chronic pain of left knee  With significant DJD.  Orthopedic referral.  - Orthopedic  Referral; Future    Chronic pain of right knee  With significant DJD.  Orthopedic referral.  - Orthopedic  Referral; Future          BMI  Estimated body mass index is 32.63 kg/m  as calculated from the following:    Height as of this encounter: 1.727 m (5' 8\").    Weight as of this encounter: 97.3 kg (214 lb 9.6 oz).             No follow-ups on file.    Barby Rashid is a 59 year old, presenting for the following health issues:  Fatigue      10/22/2024     3:24 PM   Additional Questions   Roomed by Anshu Kramer LPN     Fatigue  Associated symptoms include fatigue.     59-year-old female here for several complaints.  First, she was diagnosed with UTI with E. coli positive culture on 10/10/2024.  This was resistant to initial Bactrim prescribed so that was switched to cefuroxime.  However, she was only able to  that prescription at [facility] today and so actually has not even started that.  She does have some general questions over E. coli and management.    She also had been diagnosed with trichomoniasis with vaginal swab at that appointment.  She thought " "that an antibiotic was supposed to be sent in, but it does not appear that it had been.  She still has some dysuria and vaginal discharge.  She requests treatment for that.    She does have bipolar disorder and has been off her medications for a little while.  She was not able to delineate how long she has been off the medications.  She states she overall is not doing fully well, but does see her mental health provider tomorrow and plans to get back on the medications.    She also then has chronic bilateral knee pain.  She had been living in the [place] for a while and did see orthopedics down there.  Per Care Everywhere, she does have bone-on-bone severe arthritis based on x-ray in October 2023.  She would like to resume orthopedic care up here.  She has had cortisone injections in the past, which have helped a little bit.  This is bilateral knee pain and then some hip pain.  Achy, moderately severe, constant, worse with walking and bending activities.  She does take some ibuprofen, which helps a little bit.      She also feels like she just has some fatigue and weakness with activities.  She is not sure if that is just related to the knee pain and hip pain.  She has had significant workup down in the [facility]  in March had significant blood workup, without any significant findings.  She does not feel like it is worsened, but it just has continued.  She feels like maybe it is also just related to overall fatigue from her mental health.  No other complaints.    I have reviewed the patient's medical history in detail and updated the computerized patient record.                  Objective    /86   Pulse 60   Resp 18   Ht 1.727 m (5' 8\")   Wt 97.3 kg (214 lb 9.6 oz)   SpO2 99%   Breastfeeding No   BMI 32.63 kg/m    Body mass index is 32.63 kg/m .  Physical Exam   GENERAL: alert and no distress  RESP: lungs clear to auscultation - no rales, rhonchi or wheezes  CV: regular rate and rhythm, normal S1 S2, no " S3 or S4, no murmur, click or rub, no peripheral edema   MS: no gross musculoskeletal defects noted, no edema; BILATERAL KNEES: No gross deformity.  Typical osteoarthritis changes.  No effusion.  Diffuse peripatellar tenderness.  Normal range of motion on flexion and extension with mild crepitus.  Normal strength on flexion and extension against resistance.  Ligaments intact to valgus and varus stress.  Anterior drawer negative.  Lachmann's negative.  Ricardo's negative.  NEURO: Cranial nerves II through XII intact.  Muscle strength 5/5 upper and lower extremities bilaterally.  DTRs of upper and lower extremities equal bilaterally.  Sensation is intact.  Gait is normal.  Non-focal neurological exam.              Signed Electronically by: Abhijeet Mariano MD

## 2024-10-22 NOTE — NURSING NOTE
"Chief Complaint   Patient presents with    Fatigue       Initial /86   Pulse 60   Resp 18   Ht 1.727 m (5' 8\")   Wt 97.3 kg (214 lb 9.6 oz)   SpO2 99%   Breastfeeding No   BMI 32.63 kg/m   Estimated body mass index is 32.63 kg/m  as calculated from the following:    Height as of this encounter: 1.727 m (5' 8\").    Weight as of this encounter: 97.3 kg (214 lb 9.6 oz).  Medication Review: complete    The next two questions are to help us understand your food security.  If you are feeling you need any assistance in this area, we have resources available to support you today.          1/3/2024   SDOH- Food Insecurity   Within the past 12 months, did you worry that your food would run out before you got money to buy more? N   Within the past 12 months, did the food you bought just not last and you didn t have money to get more? N            Health Care Directive:  Patient does not have a Health Care Directive or Living Will: Discussed advance care planning with patient; however, patient declined at this time.    Joan Kramer LPN      "

## 2024-11-07 ENCOUNTER — TELEPHONE (OUTPATIENT)
Dept: FAMILY MEDICINE | Facility: OTHER | Age: 59
End: 2024-11-07
Payer: MEDICARE

## 2024-11-07 DIAGNOSIS — N30.00 ACUTE CYSTITIS WITHOUT HEMATURIA: Primary | ICD-10-CM

## 2024-11-07 NOTE — TELEPHONE ENCOUNTER
Reason for call: Request a lab order.    Order requested for what kind of lab? Urine analysis - check that the patient is clear of bladder infection and trichinosis    Who is your PCP? No Ref    Preferred method for responding to this message: Telephone Call    Phone number patient can be reached at: Cell number on file:    Telephone Information:   Mobile [phone]       If we cannot reach you directly, may we leave a detailed response at the number you provided? Yes    Lab scheduled 12/04/2024.        Mena Whitaker on 11/7/2024 at 4:22 PM

## 2024-11-08 NOTE — TELEPHONE ENCOUNTER
After the patient'sname and date of birth was verified, the patient was told the below information.  Roshni Navarrete LPN..................11/8/2024   1:24 PM

## 2024-12-03 DIAGNOSIS — M25.561 CHRONIC PAIN OF BOTH KNEES: Primary | ICD-10-CM

## 2024-12-03 DIAGNOSIS — M25.562 CHRONIC PAIN OF BOTH KNEES: Primary | ICD-10-CM

## 2024-12-03 DIAGNOSIS — G89.29 CHRONIC PAIN OF BOTH KNEES: Primary | ICD-10-CM

## 2024-12-04 ENCOUNTER — OFFICE VISIT (OUTPATIENT)
Dept: ORTHOPEDICS | Facility: OTHER | Age: 59
End: 2024-12-04
Attending: FAMILY MEDICINE
Payer: MEDICARE

## 2024-12-04 ENCOUNTER — LAB (OUTPATIENT)
Dept: LAB | Facility: OTHER | Age: 59
End: 2024-12-04
Payer: MEDICARE

## 2024-12-04 ENCOUNTER — HOSPITAL ENCOUNTER (OUTPATIENT)
Dept: GENERAL RADIOLOGY | Facility: OTHER | Age: 59
Discharge: HOME | End: 2024-12-04
Attending: ORTHOPAEDIC SURGERY
Payer: MEDICARE

## 2024-12-04 ENCOUNTER — OFFICE VISIT (OUTPATIENT)
Dept: OBGYN | Facility: OTHER | Age: 59
End: 2024-12-04
Attending: ORTHOPAEDIC SURGERY
Payer: MEDICARE

## 2024-12-04 VITALS
DIASTOLIC BLOOD PRESSURE: 82 MMHG | HEIGHT: 68 IN | HEART RATE: 54 BPM | BODY MASS INDEX: 32.58 KG/M2 | RESPIRATION RATE: 20 BRPM | SYSTOLIC BLOOD PRESSURE: 144 MMHG | OXYGEN SATURATION: 99 % | WEIGHT: 215 LBS

## 2024-12-04 VITALS — DIASTOLIC BLOOD PRESSURE: 100 MMHG | SYSTOLIC BLOOD PRESSURE: 150 MMHG | HEART RATE: 52 BPM

## 2024-12-04 DIAGNOSIS — G89.29 CHRONIC PAIN OF BOTH KNEES: ICD-10-CM

## 2024-12-04 DIAGNOSIS — G89.29 CHRONIC PAIN OF LEFT KNEE: ICD-10-CM

## 2024-12-04 DIAGNOSIS — M25.561 CHRONIC PAIN OF BOTH KNEES: ICD-10-CM

## 2024-12-04 DIAGNOSIS — M25.562 CHRONIC PAIN OF BOTH KNEES: ICD-10-CM

## 2024-12-04 DIAGNOSIS — Z11.3 SCREEN FOR STD (SEXUALLY TRANSMITTED DISEASE): Primary | ICD-10-CM

## 2024-12-04 DIAGNOSIS — R39.15 URINARY URGENCY: ICD-10-CM

## 2024-12-04 DIAGNOSIS — M25.561 CHRONIC PAIN OF RIGHT KNEE: ICD-10-CM

## 2024-12-04 DIAGNOSIS — M25.562 CHRONIC PAIN OF LEFT KNEE: ICD-10-CM

## 2024-12-04 DIAGNOSIS — G89.29 CHRONIC PAIN OF RIGHT KNEE: ICD-10-CM

## 2024-12-04 DIAGNOSIS — N30.00 ACUTE CYSTITIS WITHOUT HEMATURIA: ICD-10-CM

## 2024-12-04 LAB
ALBUMIN UR-MCNC: NEGATIVE MG/DL
APPEARANCE UR: CLEAR
BACTERIA #/AREA URNS HPF: ABNORMAL /HPF
BACTERIAL VAGINOSIS VAG-IMP: NEGATIVE
BILIRUB UR QL STRIP: NEGATIVE
CANDIDA DNA VAG QL NAA+PROBE: NOT DETECTED
CANDIDA GLABRATA / CANDIDA KRUSEI DNA: NOT DETECTED
COLOR UR AUTO: YELLOW
GLUCOSE UR STRIP-MCNC: NEGATIVE MG/DL
HGB UR QL STRIP: NEGATIVE
HYALINE CASTS: 6 /LPF
KETONES UR STRIP-MCNC: NEGATIVE MG/DL
LEUKOCYTE ESTERASE UR QL STRIP: ABNORMAL
MUCOUS THREADS #/AREA URNS LPF: PRESENT /LPF
NITRATE UR QL: NEGATIVE
PH UR STRIP: 5.5 [PH] (ref 5–9)
RBC URINE: 1 /HPF
SP GR UR STRIP: 1.02 (ref 1–1.03)
SQUAMOUS EPITHELIAL: 3 /HPF
T VAGINALIS DNA VAG QL NAA+PROBE: NOT DETECTED
TRANSITIONAL EPI: 1 /HPF
UROBILINOGEN UR STRIP-MCNC: NORMAL MG/DL
WBC URINE: 10 /HPF

## 2024-12-04 PROCEDURE — 250N000009 HC RX 250: Performed by: ORTHOPAEDIC SURGERY

## 2024-12-04 PROCEDURE — G0463 HOSPITAL OUTPT CLINIC VISIT: HCPCS

## 2024-12-04 PROCEDURE — 87086 URINE CULTURE/COLONY COUNT: CPT | Mod: ZL

## 2024-12-04 PROCEDURE — G0463 HOSPITAL OUTPT CLINIC VISIT: HCPCS | Mod: 27

## 2024-12-04 PROCEDURE — 250N000011 HC RX IP 250 OP 636: Mod: JZ | Performed by: ORTHOPAEDIC SURGERY

## 2024-12-04 PROCEDURE — 73564 X-RAY EXAM KNEE 4 OR MORE: CPT | Mod: 50

## 2024-12-04 PROCEDURE — 0352U MULTIPLEX VAGINAL PANEL BY PCR: CPT | Mod: ZL | Performed by: NURSE PRACTITIONER

## 2024-12-04 PROCEDURE — 81001 URINALYSIS AUTO W/SCOPE: CPT | Mod: ZL

## 2024-12-04 RX ORDER — TRIAMCINOLONE ACETONIDE 40 MG/ML
40 INJECTION, SUSPENSION INTRA-ARTICULAR; INTRAMUSCULAR ONCE
Status: COMPLETED | OUTPATIENT
Start: 2024-12-04 | End: 2024-12-04

## 2024-12-04 RX ORDER — MELOXICAM 15 MG/1
15 TABLET ORAL DAILY
Qty: 30 TABLET | Refills: 2 | Status: SHIPPED | OUTPATIENT
Start: 2024-12-04

## 2024-12-04 RX ORDER — LIDOCAINE HYDROCHLORIDE 10 MG/ML
8 INJECTION, SOLUTION INFILTRATION; PERINEURAL ONCE
Status: COMPLETED | OUTPATIENT
Start: 2024-12-04 | End: 2024-12-04

## 2024-12-04 RX ADMIN — LIDOCAINE HYDROCHLORIDE 8 ML: 10 INJECTION, SOLUTION INFILTRATION; PERINEURAL at 13:48

## 2024-12-04 RX ADMIN — TRIAMCINOLONE ACETONIDE 40 MG: 40 INJECTION, SUSPENSION INTRA-ARTICULAR; INTRAMUSCULAR at 13:48

## 2024-12-04 RX ADMIN — TRIAMCINOLONE ACETONIDE 40 MG: 40 INJECTION, SUSPENSION INTRA-ARTICULAR; INTRAMUSCULAR at 13:49

## 2024-12-04 NOTE — PROGRESS NOTES
CHIEF COMPLAINT: Follow up infection testing     HPI  Gay is a 59 year old , who presents to clinic today for follow-up testing to ensure vaginal infection has cleared.  Patient tested positive for trichomonas 8 months ago and again 1 month ago.  She is unsure if her partner has completed appropriate treatment.    She denies any vaginal itching or vaginal discharge.  No pain with intercourse.    Does have urinary frequency and urgency but denies any dysuria.  No hematuria.  No abdominal pain or flank pain.  She has been afebrile at home.  She was had urinalysis collected earlier today    No LMP recorded. Patient is postmenopausal.    I have reviewed the nursing notes.  I have reviewed allergies, medication list, problem list, and past medical history.    OB HISTORY  OB History    Para Term  AB Living   2 1 1 0 1 1   SAB IAB Ectopic Multiple Live Births   1 0 0 0 1      # Outcome Date GA Lbr Bryant/2nd Weight Sex Type Anes PTL Lv   2 SAB            1 Term     M CS-Unspec   AKASH       ROS  10-Point ROS negative except as noted in HPI    PHYSICAL EXAM  BP (!) 150/100   Pulse 52   There is no height or weight on file to calculate BMI.  Gen: Well-appearing, well developed, non toxic  Resp: nonlabored, normal effort, no audible wheezing or cough  GI: soft, nontender, no flank pain  MS: moving without difficulty  Psych: appropriate mood and affect    Pelvic:  Normal appearing external female genitalia. Normal hair distribution. Vagina is without lesions. No discharge. Cervix normal, no lesions, no cervical motion tenderness. Uterus is small, mobile, non-tender. No adnexal tenderness or masses.  urethral prolapse.  Chaperone present     DIAGNOSTICS  Results for orders placed or performed in visit on 24   UA with Microscopic reflex to Culture     Status: Abnormal    Specimen: Urine, Clean Catch   Result Value Ref Range    Color Urine Yellow Colorless, Straw, Light Yellow, Yellow    Appearance  Urine Clear Clear    Glucose Urine Negative Negative mg/dL    Bilirubin Urine Negative Negative    Ketones Urine Negative Negative mg/dL    Specific Gravity Urine 1.024 1.000 - 1.030    Blood Urine Negative Negative    pH Urine 5.5 5.0 - 9.0    Protein Albumin Urine Negative Negative mg/dL    Urobilinogen Urine Normal Normal, 2.0 mg/dL    Nitrite Urine Negative Negative    Leukocyte Esterase Urine Moderate (A) Negative    Bacteria Urine Few (A) None Seen /HPF    Mucus Urine Present (A) None Seen /LPF    RBC Urine 1 <=2 /HPF    WBC Urine 10 (H) <=5 /HPF    Squamous Epithelials Urine 3 (H) <=1 /HPF    Transitional Epithelials Urine 1 <=1 /HPF    Hyaline Casts Urine 6 (H) <=2 /LPF    Narrative    Urine Culture ordered based on laboratory criteria   Results for orders placed or performed during the hospital encounter of 24   XR Knee Standing 2v Bilateral & 2v Bilateral     Status: None    Narrative    Exam: XR KNEE STANDING 2 VW BILAT AND 2 VW BILAT     History:Female, age 59 years, Chronic pain of both knees; Chronic pain  of both knees; Chronic pain of both knees    Comparison:  No relevant prior imaging.    Technique: Four views of each knee are submitted.    Findings: Bones are normally mineralized. No evidence of acute or  subacute fracture.  No evidence of dislocation.           Impression    Impression:  No evidence of acute or subacute bony abnormality.     Moderate to severe tricompartmental degenerative change, most severe  in the medial compartment of each knee.    PRETTY DAMON MD         SYSTEM ID:  [id number]        ASSESSMENT/PLAN  Gay Romero is a 59 year old  here for STI screening  1. Screen for STD (sexually transmitted disease) (Primary)  - Multiplex Vaginal Panel by PCR    History of trichomonas 8 months ago and 1 month ago.  She does have a partner she sees in [place].  She is unsure if he completed appropriate treatment.  She denies any vaginal symptoms but would like to  ensure infection has cleared.  MVP testing collected today.  Pelvic exam normal, no abnormal discharge.    2. Urinary urgency  Denies any increased urinary frequency and urgency.  No dysuria or hematuria.  No abdominal pain or flank pain.  Urinalysis obtained earlier today and returned positive for leukocyte esterase, 10.  Negative for nitrates or hematuria. Positive for squamous epithelial cells.  I expressed concern for possible contamination and would recommend waiting on antibiotic treatment until urine culture returns in 48 hours.  She will be notified of results and started on appropriate treatment if indicated.    Explanation of diagnosis, treatment options and risk and benefits of medications reviewed with patient/caregiver. Patient/caregiver agrees with plan of care. Red flags symptoms, Strict ER precautions and when to return for follow up were discussed and patient/caregiver.    Fiordaliza Marin NP  [facility]  OBGYN

## 2024-12-04 NOTE — PROGRESS NOTES
Surgical Clinic Consult  Primary physician:     No Ref-Primary, Physician    Chief complaint:   Bilateral knee pain    History of present illness:  This is a 59 year old female I am seeing in consultation for bilateral knee pain has been ongoing for a long period of time.  Discomfort with activity.  Patient is tried injections in the past like to do that once again as she needs to care for her 86-year-old mother.  Patient does not feel any upcoming surgery would be something she can do at this current time.  Patient overall reports discomfort with activity.    Past medical history:   No past medical history on file.    Pastsurgical history:  No past surgical history on file.    Current medications:  Current Outpatient Medications   Medication Sig Dispense Refill    meloxicam (MOBIC) 15 MG tablet Take 1 tablet (15 mg) by mouth daily. 30 tablet 2    buPROPion (WELLBUTRIN XL) 300 MG 24 hr tablet Take 1 tablet by mouth daily at 2 pm.      hydrOXYzine (ATARAX) 50 MG tablet Take 50 mg by mouth 3 times daily as needed (Patient not taking: Reported on 10/10/2024)      ibuprofen (ADVIL/MOTRIN) 600 MG tablet Take 600 mg by mouth 3 times daily.      lamoTRIgine (LAMICTAL) 25 MG tablet Take 25 mg by mouth at bedtime.      multivitamin w/minerals (THERA-VIT-M) tablet Take 1 tablet by mouth daily.      naltrexone (DEPADE/REVIA) 50 MG tablet Take 50 mg by mouth daily.      QUEtiapine (SEROQUEL) 100 MG tablet Take 100 mg by mouth (Patient taking differently: Take 50 mg by mouth.)      sertraline (ZOLOFT) 50 MG tablet Take 1 tablet by mouth daily at 2 pm.      topiramate (TOPAMAX) 25 MG tablet Take 25 mg by mouth 2 times daily.      VRAYLAR 1.5 MG capsule  (Patient not taking: Reported on 12/4/2024)         Allergies:  Allergies   Allergen Reactions    Influenza Virus Vaccine [Influenza Virus Vaccine]      Guillain-[place] syndrome    Hydrocodone Rash     possible       Family history:  No family history on file.    Social  "history:  Social History     Socioeconomic History    Marital status: Single     Spouse name: Not on file    Number of children: Not on file    Years of education: Not on file    Highest education level: Not on file   Occupational History    Not on file   Tobacco Use    Smoking status: Every Day     Current packs/day: 0.50     Types: Cigarettes    Smokeless tobacco: Former   Vaping Use    Vaping status: Never Used   Substance and Sexual Activity    Alcohol use: Not Currently    Drug use: Not Currently     Types: \"Crack\" cocaine    Sexual activity: Yes     Partners: Male   Other Topics Concern    Not on file   Social History Narrative    Not on file     Social Drivers of Health     Financial Resource Strain: Low Risk  (1/3/2024)    Financial Resource Strain     Within the past 12 months, have you or your family members you live with been unable to get utilities (heat, electricity) when it was really needed?: No   Food Insecurity: Low Risk  (1/3/2024)    Food Insecurity     Within the past 12 months, did you worry that your food would run out before you got money to buy more?: No     Within the past 12 months, did the food you bought just not last and you didn t have money to get more?: No   Transportation Needs: Low Risk  (1/3/2024)    Transportation Needs     Within the past 12 months, has lack of transportation kept you from medical appointments, getting your medicines, non-medical meetings or appointments, work, or from getting things that you need?: No   Physical Activity: Not on file   Stress: Not on file   Social Connections: Not on file   Interpersonal Safety: Low Risk  (10/22/2024)    Interpersonal Safety     Do you feel physically and emotionally safe where you currently live?: Yes     Within the past 12 months, have you been hit, slapped, kicked or otherwise physically hurt by someone?: No     Within the past 12 months, have you been humiliated or emotionally abused in other ways by your partner or " "ex-partner?: No   Housing Stability: Low Risk  (1/3/2024)    Housing Stability     Do you have housing? : Yes     Are you worried about losing your housing?: No       PROBLEM LIST:  Patient Active Problem List   Diagnosis    Bipolar I disorder (H)    Severe episode of recurrent major depressive disorder, with psychotic features (H)    Mixed obsessional thoughts and acts    Paroxysmal supraventricular tachycardia (H)    Cocaine abuse (H)    Behcet's disease (H)    Positive FIT (fecal immunochemical test)    History of Guillain-[place] syndrome    Hx of venereal warts    Hepatitis C antibody positive in blood    Palpitations    Tachycardia    Abnormal electrocardiogram    History of cocaine abuse (H)    Drug abuse, nondependent (H)    Obesity    Impulse control disorder    Primary osteoarthritis of both knees    Severe bipolar I disorder, current or most recent episode mixed (H)       Review of Systems:  COMPLETE 12 point REVIEW OF SYSTEMS is otherwise negative with the exception of which is stated above.    Physical exam: BP (!) 144/82   Pulse 54   Resp 20   Ht 1.727 m (5' 8\")   Wt 97.5 kg (215 lb)   SpO2 99%   BMI 32.69 kg/m      General: this is a pleasant female patient in no acute distress.  Patient is awake alert and oriented x3 .   EXAM:  Chest/Respiratory Exam: Normal - Clear to auscultation without rales, rhonchi, or wheezing.  Cardiovascular Exam: normal  Musculoskeletal: Bilateral knee examination shows varus deformity.  Tenderness across both medial joint lines.  Range of motion -10 to about 115 degrees bilaterally.  Crepitation with flexion extension otherwise noted.  Ligamentously patient is stable.    PROCEDURE NOTE: Bilateral knees are injected with 4 cc 1% lidocaine and 40 mg of Kenalog under sterile conditions.    Imaging: 3 views of each knee show end-stage arthrosis with varus deformity and patellofemoral arthrosis present there as well.  Grade 4 in nature.    Assessment:   Bilateral knee " arthrosis severe with varus deformity    Plan:    Bilateral knee injections as stated above.  Repeat injections in the future as necessary.  Long-term management the timing works for her proceed for joint reconstruction.      Jose Guadalupe Kenny MD

## 2024-12-05 LAB — BACTERIA UR CULT: NORMAL

## 2025-01-27 ENCOUNTER — OFFICE VISIT (OUTPATIENT)
Dept: FAMILY MEDICINE | Facility: OTHER | Age: 60
End: 2025-01-27
Attending: PHYSICIAN ASSISTANT
Payer: MEDICARE

## 2025-01-27 VITALS
HEART RATE: 51 BPM | OXYGEN SATURATION: 99 % | TEMPERATURE: 98.2 F | RESPIRATION RATE: 18 BRPM | SYSTOLIC BLOOD PRESSURE: 128 MMHG | WEIGHT: 221.8 LBS | DIASTOLIC BLOOD PRESSURE: 88 MMHG | HEIGHT: 68 IN | BODY MASS INDEX: 33.62 KG/M2

## 2025-01-27 DIAGNOSIS — N39.0 URINARY TRACT INFECTION WITHOUT HEMATURIA, SITE UNSPECIFIED: Primary | ICD-10-CM

## 2025-01-27 LAB
ALBUMIN UR-MCNC: NEGATIVE MG/DL
APPEARANCE UR: CLEAR
BACTERIA #/AREA URNS HPF: ABNORMAL /HPF
BILIRUB UR QL STRIP: NEGATIVE
COLOR UR AUTO: YELLOW
GLUCOSE UR STRIP-MCNC: NEGATIVE MG/DL
HGB UR QL STRIP: NEGATIVE
KETONES UR STRIP-MCNC: NEGATIVE MG/DL
LEUKOCYTE ESTERASE UR QL STRIP: ABNORMAL
NITRATE UR QL: NEGATIVE
PH UR STRIP: 6 [PH] (ref 5–9)
RBC URINE: 0 /HPF
SP GR UR STRIP: >=1.03 (ref 1–1.03)
SQUAMOUS EPITHELIAL: 2 /HPF
UROBILINOGEN UR STRIP-MCNC: NORMAL MG/DL
WBC URINE: 11 /HPF

## 2025-01-27 PROCEDURE — G0463 HOSPITAL OUTPT CLINIC VISIT: HCPCS

## 2025-01-27 PROCEDURE — 87086 URINE CULTURE/COLONY COUNT: CPT | Mod: ZL | Performed by: PHYSICIAN ASSISTANT

## 2025-01-27 PROCEDURE — 81001 URINALYSIS AUTO W/SCOPE: CPT | Mod: ZL | Performed by: PHYSICIAN ASSISTANT

## 2025-01-27 RX ORDER — LAMOTRIGINE 100 MG/1
1 TABLET ORAL
COMMUNITY
Start: 2024-11-06

## 2025-01-27 RX ORDER — QUETIAPINE FUMARATE 50 MG/1
50 TABLET, FILM COATED ORAL AT BEDTIME
COMMUNITY
Start: 2024-11-06

## 2025-01-27 ASSESSMENT — PATIENT HEALTH QUESTIONNAIRE - PHQ9
SUM OF ALL RESPONSES TO PHQ QUESTIONS 1-9: 5
SUM OF ALL RESPONSES TO PHQ QUESTIONS 1-9: 5
10. IF YOU CHECKED OFF ANY PROBLEMS, HOW DIFFICULT HAVE THESE PROBLEMS MADE IT FOR YOU TO DO YOUR WORK, TAKE CARE OF THINGS AT HOME, OR GET ALONG WITH OTHER PEOPLE: SOMEWHAT DIFFICULT

## 2025-01-27 ASSESSMENT — PAIN SCALES - GENERAL: PAINLEVEL_OUTOF10: NO PAIN (0)

## 2025-01-27 NOTE — PROGRESS NOTES
Assessment & Plan     Urinary tract infection without hematuria, site unspecified  UA nitrite negative, pyuria without hematuria.  Will await urine culture results prior to initiating antibiotics.  - UA Macroscopic with reflex to Microscopic and Culture  - Urine Culture          No follow-ups on file.    Barby Rashid is a 59 year old, presenting for the following health issues:  UTI    History of Present Illness       Reason for visit:  Possible   She is taking medications regularly.     Here for evaluation of urinary concerns. Reports 7-8 days of dysuria. No significant associated urinary frequency or urgency, hematuria, flank pain, vaginal discharge changes. Does report some vaginal dryness that has been bothersome with intercourse. Using OTC cream and lubrication with intercourse. Not interested in hormonal treatment at this time due to concerns for side effects.       PAST MEDICAL HISTORY: No past medical history on file.    PAST SURGICAL HISTORY: No past surgical history on file.    FAMILY HISTORY: No family history on file.    SOCIAL HISTORY:   Social History     Tobacco Use    Smoking status: Every Day     Current packs/day: 0.50     Types: Cigarettes    Smokeless tobacco: Former   Substance Use Topics    Alcohol use: Not Currently        Allergies   Allergen Reactions    Influenza Virus Vaccine [Influenza Virus Vaccine]      Guillain-[place] syndrome    Hydrocodone Rash     possible     Current Outpatient Medications   Medication Sig Dispense Refill    buPROPion (WELLBUTRIN XL) 300 MG 24 hr tablet Take 1 tablet by mouth daily at 2 pm.      ibuprofen (ADVIL/MOTRIN) 600 MG tablet Take 600 mg by mouth 3 times daily.      lamoTRIgine (LAMICTAL) 100 MG tablet Take 1 tablet by mouth 2 times daily.      lamoTRIgine (LAMICTAL) 25 MG tablet Take 25 mg by mouth at bedtime.      meloxicam (MOBIC) 15 MG tablet Take 1 tablet (15 mg) by mouth daily. 30 tablet 2    multivitamin w/minerals (THERA-VIT-M) tablet  "Take 1 tablet by mouth daily.      naltrexone (DEPADE/REVIA) 50 MG tablet Take 50 mg by mouth daily.      QUEtiapine (SEROQUEL) 100 MG tablet Take 100 mg by mouth (Patient taking differently: Take 50 mg by mouth.)      QUEtiapine (SEROQUEL) 50 MG tablet Take 50 mg by mouth at bedtime.      sertraline (ZOLOFT) 50 MG tablet Take 1 tablet by mouth daily at 2 pm.      topiramate (TOPAMAX) 25 MG tablet Take 25 mg by mouth 2 times daily.      hydrOXYzine (ATARAX) 50 MG tablet Take 50 mg by mouth 3 times daily as needed (Patient not taking: Reported on 1/27/2025)      VRAYLAR 1.5 MG capsule  (Patient not taking: Reported on 10/10/2024)       No current facility-administered medications for this visit.           Objective    /88   Pulse 51   Temp 98.2  F (36.8  C) (Tympanic)   Resp 18   Ht 1.727 m (5' 8\")   Wt 100.6 kg (221 lb 12.8 oz)   SpO2 99%   BMI 33.72 kg/m    Body mass index is 33.72 kg/m .  Physical Exam   General: Pleasant, in no apparent distress.  Skin: No jaundice, pallor, rashes, or lesions.  Psych: Appropriate mood and affect.    Results for orders placed or performed in visit on 01/27/25   UA Macroscopic with reflex to Microscopic and Culture     Status: Abnormal    Specimen: Urine, Clean Catch   Result Value Ref Range    Color Urine Yellow Colorless, Straw, Light Yellow, Yellow    Appearance Urine Clear Clear    Glucose Urine Negative Negative mg/dL    Bilirubin Urine Negative Negative    Ketones Urine Negative Negative mg/dL    Specific Gravity Urine >=1.030 1.005 - 1.030    Blood Urine Negative Negative    pH Urine 6.0 5.0 - 9.0    Protein Albumin Urine Negative Negative mg/dL    Urobilinogen Urine Normal 0.2, 1.0, Normal mg/dL    Nitrite Urine Negative Negative    Leukocyte Esterase Urine Small (A) Negative    Bacteria Urine Few (A) None Seen /HPF    RBC Urine 0 <=2 /HPF    WBC Urine 11 (H) <=5 /HPF    Squamous Epithelials Urine 2 (H) <=1 /HPF    Narrative    Urine Culture ordered based on " laboratory criteria       Signed Electronically by: Kamla Paulino PA-C

## 2025-01-28 LAB — BACTERIA UR CULT: NORMAL

## 2025-02-26 ENCOUNTER — MYC MEDICAL ADVICE (OUTPATIENT)
Dept: FAMILY MEDICINE | Facility: OTHER | Age: 60
End: 2025-02-26
Payer: MEDICARE

## 2025-02-26 DIAGNOSIS — Z11.3 SCREENING EXAMINATION FOR STI: Primary | ICD-10-CM

## 2025-02-26 NOTE — TELEPHONE ENCOUNTER
Negative for syphilis (treponema)  Looks like no gonorrhea or chlamydia testing completed-- I will order this if she can come in for a lab only urine test.   Hepatitis C antibody is positive meaning at some point she had this and then cleared it

## 2025-02-26 NOTE — TELEPHONE ENCOUNTER
"Pt wondering about whether STD testing was done.  (Gonorrhea, Syphilis)     Wondering about Hep C/Hep B run. (Care gaps I'm assuming)    ____________________________________________________________    2/21/25  LOV with [facility] for \"exposure to STD\"    Here are my comments about the recent results. Hepatitis C antibody means previous hepatitis infection     Please let us know if you have any questions or concerns.    Melissa Hall RN on 2/26/2025 at 12:49 PM    "

## 2025-03-05 ENCOUNTER — OFFICE VISIT (OUTPATIENT)
Dept: ORTHOPEDICS | Facility: OTHER | Age: 60
End: 2025-03-05
Attending: ORTHOPAEDIC SURGERY
Payer: MEDICARE

## 2025-03-05 ENCOUNTER — APPOINTMENT (OUTPATIENT)
Dept: LAB | Facility: OTHER | Age: 60
End: 2025-03-05
Attending: ORTHOPAEDIC SURGERY
Payer: MEDICARE

## 2025-03-05 DIAGNOSIS — M25.561 CHRONIC PAIN OF RIGHT KNEE: Primary | ICD-10-CM

## 2025-03-05 DIAGNOSIS — M25.562 CHRONIC PAIN OF LEFT KNEE: ICD-10-CM

## 2025-03-05 DIAGNOSIS — M54.42 ACUTE RIGHT-SIDED LOW BACK PAIN WITH LEFT-SIDED SCIATICA: ICD-10-CM

## 2025-03-05 DIAGNOSIS — G89.29 CHRONIC PAIN OF RIGHT KNEE: Primary | ICD-10-CM

## 2025-03-05 DIAGNOSIS — G89.29 CHRONIC PAIN OF LEFT KNEE: ICD-10-CM

## 2025-03-05 LAB
C TRACH DNA SPEC QL PROBE+SIG AMP: NEGATIVE
N GONORRHOEA DNA SPEC QL NAA+PROBE: NEGATIVE
SPECIMEN TYPE: NORMAL

## 2025-03-05 PROCEDURE — 250N000011 HC RX IP 250 OP 636: Mod: JZ | Performed by: ORTHOPAEDIC SURGERY

## 2025-03-05 PROCEDURE — G0463 HOSPITAL OUTPT CLINIC VISIT: HCPCS

## 2025-03-05 PROCEDURE — 250N000009 HC RX 250: Performed by: ORTHOPAEDIC SURGERY

## 2025-03-05 RX ORDER — TRIAMCINOLONE ACETONIDE 40 MG/ML
40 INJECTION, SUSPENSION INTRA-ARTICULAR; INTRAMUSCULAR ONCE
Status: COMPLETED | OUTPATIENT
Start: 2025-03-05 | End: 2025-03-05

## 2025-03-05 RX ORDER — LIDOCAINE HYDROCHLORIDE 10 MG/ML
8 INJECTION, SOLUTION INFILTRATION; PERINEURAL ONCE
Status: COMPLETED | OUTPATIENT
Start: 2025-03-05 | End: 2025-03-05

## 2025-03-05 RX ORDER — MELOXICAM 15 MG/1
15 TABLET ORAL DAILY
Qty: 90 TABLET | Refills: 1 | Status: SHIPPED | OUTPATIENT
Start: 2025-03-05

## 2025-03-05 RX ADMIN — TRIAMCINOLONE ACETONIDE 40 MG: 40 INJECTION, SUSPENSION INTRA-ARTICULAR; INTRAMUSCULAR at 10:18

## 2025-03-05 RX ADMIN — LIDOCAINE HYDROCHLORIDE 8 ML: 10 INJECTION, SOLUTION INFILTRATION; PERINEURAL at 10:18

## 2025-03-05 NOTE — PROGRESS NOTES
SUBJECTIVE:  Patient returns with regards to bilateral knee pain.  Patient is here for injection at this time.  Patient reports last injections worked for least a month and a half or thereabouts.  Patient reports increased discomfort with activity noted at this time.    ROS: Musculoskeletal and general review of systems are negative, per review of previous clinic questionnaire.  Denies SOB and calf pain.    EXAM: Bilateral knee examination shows tenderness across medial joint line.  Crepitation with flexion and extension.  Neuroexam otherwise intact.    Patient also reports a strain to her lower back and increased discomfort with hyperextension lumbar spine as well as straight leg raise more so on the left than the right.    PROCEDURE NOTE: Bilateral knees are injected with 4 cc of 1% lidocaine and 40 mg of Kenalog under sterile conditions.    IMAGING: None    ASSESSMENT: Bilateral knee underlying arthrosis.  Lumbar strain with left lower extremity sciatica-like symptoms    PLAN: Injection here today.  Should back symptoms continue to persist should consider MRI evaluation.  Did get patient set up on a muscle relaxant as well.    Jose Guadalupe Kenny MD

## 2025-04-08 DIAGNOSIS — M54.42 ACUTE RIGHT-SIDED LOW BACK PAIN WITH LEFT-SIDED SCIATICA: ICD-10-CM

## 2025-04-13 ENCOUNTER — HEALTH MAINTENANCE LETTER (OUTPATIENT)
Age: 60
End: 2025-04-13

## 2025-06-04 ENCOUNTER — TELEPHONE (OUTPATIENT)
Dept: ORTHOPEDICS | Facility: OTHER | Age: 60
End: 2025-06-04
Payer: MEDICARE

## 2025-06-04 NOTE — TELEPHONE ENCOUNTER
Reason for call: Medication or medication refill    Have you contacted your pharmacy regarding this refill? Yes    If No, direct the patient to contact the Pharmacy and discontinue telephone note using Erroneous Encounter.  If Yes, continue.    Name of medication requested: Meloxicam 15MG tab    How many days of medication do you have left? 2    What pharmacy do you use? [facility]    Preferred method for responding to this message: Telephone Call    Phone number patient can be reached at: Cell number on file:    Telephone Information:   Mobile [phone]       If we cannot reach you directly, may we leave a detailed response at the number you provided? Yes    Adriana Hanson on 6/4/2025 at 1:28 PM

## 2025-06-05 NOTE — TELEPHONE ENCOUNTER
Refill request sent to [facility] office.  Savita Beltre LPN .....................6/5/2025 11:39 AM

## 2025-06-15 ENCOUNTER — HEALTH MAINTENANCE LETTER (OUTPATIENT)
Age: 60
End: 2025-06-15

## 2025-06-18 ENCOUNTER — RESULTS FOLLOW-UP (OUTPATIENT)
Dept: FAMILY MEDICINE | Facility: OTHER | Age: 60
End: 2025-06-18

## 2025-06-18 ENCOUNTER — OFFICE VISIT (OUTPATIENT)
Dept: FAMILY MEDICINE | Facility: OTHER | Age: 60
End: 2025-06-18
Attending: FAMILY MEDICINE
Payer: MEDICARE

## 2025-06-18 ENCOUNTER — OFFICE VISIT (OUTPATIENT)
Dept: ORTHOPEDICS | Facility: OTHER | Age: 60
End: 2025-06-18
Attending: ORTHOPAEDIC SURGERY
Payer: MEDICARE

## 2025-06-18 VITALS
WEIGHT: 220.4 LBS | SYSTOLIC BLOOD PRESSURE: 150 MMHG | HEART RATE: 57 BPM | BODY MASS INDEX: 33.51 KG/M2 | RESPIRATION RATE: 18 BRPM | OXYGEN SATURATION: 99 % | TEMPERATURE: 97.3 F | DIASTOLIC BLOOD PRESSURE: 92 MMHG

## 2025-06-18 DIAGNOSIS — F31.63 SEVERE BIPOLAR I DISORDER, CURRENT OR MOST RECENT EPISODE MIXED (H): ICD-10-CM

## 2025-06-18 DIAGNOSIS — M25.562 CHRONIC PAIN OF LEFT KNEE: Primary | ICD-10-CM

## 2025-06-18 DIAGNOSIS — R39.9 UTI SYMPTOMS: Primary | ICD-10-CM

## 2025-06-18 DIAGNOSIS — M25.561 CHRONIC PAIN OF RIGHT KNEE: ICD-10-CM

## 2025-06-18 DIAGNOSIS — M54.42 ACUTE RIGHT-SIDED LOW BACK PAIN WITH LEFT-SIDED SCIATICA: ICD-10-CM

## 2025-06-18 DIAGNOSIS — N76.0 BACTERIAL VAGINOSIS: ICD-10-CM

## 2025-06-18 DIAGNOSIS — Z79.899 OTHER LONG TERM (CURRENT) DRUG THERAPY: ICD-10-CM

## 2025-06-18 DIAGNOSIS — B96.89 BACTERIAL VAGINOSIS: ICD-10-CM

## 2025-06-18 DIAGNOSIS — N94.10 DYSPAREUNIA, FEMALE: ICD-10-CM

## 2025-06-18 DIAGNOSIS — N30.00 ACUTE CYSTITIS WITHOUT HEMATURIA: ICD-10-CM

## 2025-06-18 DIAGNOSIS — I10 BENIGN ESSENTIAL HYPERTENSION: ICD-10-CM

## 2025-06-18 DIAGNOSIS — F33.3 SEVERE EPISODE OF RECURRENT MAJOR DEPRESSIVE DISORDER, WITH PSYCHOTIC FEATURES (H): ICD-10-CM

## 2025-06-18 DIAGNOSIS — G89.29 CHRONIC PAIN OF RIGHT KNEE: ICD-10-CM

## 2025-06-18 DIAGNOSIS — L65.9 HAIR LOSS: ICD-10-CM

## 2025-06-18 DIAGNOSIS — N95.1 MENOPAUSAL VAGINAL DRYNESS: ICD-10-CM

## 2025-06-18 DIAGNOSIS — G89.29 CHRONIC PAIN OF LEFT KNEE: Primary | ICD-10-CM

## 2025-06-18 DIAGNOSIS — F14.11 HISTORY OF COCAINE ABUSE (H): ICD-10-CM

## 2025-06-18 LAB
ALBUMIN SERPL BCG-MCNC: 4.1 G/DL (ref 3.5–5.2)
ALBUMIN UR-MCNC: 100 MG/DL
ALP SERPL-CCNC: 93 U/L (ref 40–150)
ALT SERPL W P-5'-P-CCNC: 14 U/L (ref 0–50)
ANION GAP SERPL CALCULATED.3IONS-SCNC: 11 MMOL/L (ref 7–15)
APPEARANCE UR: ABNORMAL
AST SERPL W P-5'-P-CCNC: 22 U/L (ref 0–45)
BACTERIA #/AREA URNS HPF: ABNORMAL /HPF
BACTERIAL VAGINOSIS VAG-IMP: POSITIVE
BASOPHILS # BLD AUTO: 0.1 10E3/UL (ref 0–0.2)
BASOPHILS NFR BLD AUTO: 0 %
BILIRUB SERPL-MCNC: 0.7 MG/DL
BILIRUB UR QL STRIP: NEGATIVE
BUN SERPL-MCNC: 30.8 MG/DL (ref 8–23)
C TRACH DNA SPEC QL PROBE+SIG AMP: NEGATIVE
CALCIUM SERPL-MCNC: 9.8 MG/DL (ref 8.8–10.4)
CANDIDA DNA VAG QL NAA+PROBE: NOT DETECTED
CANDIDA GLABRATA / CANDIDA KRUSEI DNA: NOT DETECTED
CHLORIDE SERPL-SCNC: 110 MMOL/L (ref 98–107)
COLOR UR AUTO: YELLOW
CREAT SERPL-MCNC: 1.3 MG/DL (ref 0.51–0.95)
EGFRCR SERPLBLD CKD-EPI 2021: 47 ML/MIN/1.73M2
EOSINOPHIL # BLD AUTO: 0.1 10E3/UL (ref 0–0.7)
EOSINOPHIL NFR BLD AUTO: 1 %
ERYTHROCYTE [DISTWIDTH] IN BLOOD BY AUTOMATED COUNT: 13.1 % (ref 10–15)
EST. AVERAGE GLUCOSE BLD GHB EST-MCNC: 100 MG/DL
FOLATE SERPL-MCNC: >20 NG/ML (ref 4.6–34.8)
GLUCOSE SERPL-MCNC: 94 MG/DL (ref 70–99)
GLUCOSE UR STRIP-MCNC: NEGATIVE MG/DL
HBA1C MFR BLD: 5.1 %
HCO3 SERPL-SCNC: 21 MMOL/L (ref 22–29)
HCT VFR BLD AUTO: 39.9 % (ref 35–47)
HGB BLD-MCNC: 13.3 G/DL (ref 11.7–15.7)
HGB UR QL STRIP: ABNORMAL
IMM GRANULOCYTES # BLD: 0 10E3/UL
IMM GRANULOCYTES NFR BLD: 0 %
IRON BINDING CAPACITY (ROCHE): 288 UG/DL (ref 240–430)
IRON SATN MFR SERPL: 21 % (ref 15–46)
IRON SERPL-MCNC: 60 UG/DL (ref 37–145)
KETONES UR STRIP-MCNC: NEGATIVE MG/DL
LEUKOCYTE ESTERASE UR QL STRIP: ABNORMAL
LYMPHOCYTES # BLD AUTO: 2.2 10E3/UL (ref 0.8–5.3)
LYMPHOCYTES NFR BLD AUTO: 18 %
MCH RBC QN AUTO: 29.7 PG (ref 26.5–33)
MCHC RBC AUTO-ENTMCNC: 33.3 G/DL (ref 31.5–36.5)
MCV RBC AUTO: 89 FL (ref 78–100)
MONOCYTES # BLD AUTO: 1 10E3/UL (ref 0–1.3)
MONOCYTES NFR BLD AUTO: 8 %
MUCOUS THREADS #/AREA URNS LPF: PRESENT /LPF
N GONORRHOEA DNA SPEC QL NAA+PROBE: NEGATIVE
NEUTROPHILS # BLD AUTO: 9.1 10E3/UL (ref 1.6–8.3)
NEUTROPHILS NFR BLD AUTO: 73 %
NITRATE UR QL: POSITIVE
NRBC # BLD AUTO: 0 10E3/UL
NRBC BLD AUTO-RTO: 0 /100
PH UR STRIP: 5.5 [PH] (ref 5–9)
PLATELET # BLD AUTO: 214 10E3/UL (ref 150–450)
POTASSIUM SERPL-SCNC: 4.3 MMOL/L (ref 3.4–5.3)
PROT SERPL-MCNC: 7.1 G/DL (ref 6.4–8.3)
RBC # BLD AUTO: 4.48 10E6/UL (ref 3.8–5.2)
RBC URINE: 40 /HPF
SODIUM SERPL-SCNC: 142 MMOL/L (ref 135–145)
SP GR UR STRIP: 1.01 (ref 1–1.03)
SPECIMEN TYPE: NORMAL
T VAGINALIS DNA VAG QL NAA+PROBE: NOT DETECTED
T4 FREE SERPL-MCNC: 1.02 NG/DL (ref 0.9–1.7)
TSH SERPL DL<=0.005 MIU/L-ACNC: 2.61 UIU/ML (ref 0.3–4.2)
UROBILINOGEN UR STRIP-MCNC: NORMAL MG/DL
VIT B12 SERPL-MCNC: 645 PG/ML (ref 232–1245)
VIT D+METAB SERPL-MCNC: 34 NG/ML (ref 20–50)
WBC # BLD AUTO: 12.4 10E3/UL (ref 4–11)
WBC CLUMPS #/AREA URNS HPF: PRESENT /HPF
WBC URINE: >182 /HPF

## 2025-06-18 PROCEDURE — 85025 COMPLETE CBC W/AUTO DIFF WBC: CPT | Mod: ZL | Performed by: FAMILY MEDICINE

## 2025-06-18 PROCEDURE — 81515 NFCT DS BV&VAGINITIS DNA ALG: CPT | Mod: ZL | Performed by: FAMILY MEDICINE

## 2025-06-18 PROCEDURE — 83550 IRON BINDING TEST: CPT | Mod: ZL | Performed by: FAMILY MEDICINE

## 2025-06-18 PROCEDURE — 82040 ASSAY OF SERUM ALBUMIN: CPT | Mod: ZL | Performed by: FAMILY MEDICINE

## 2025-06-18 PROCEDURE — 87491 CHLMYD TRACH DNA AMP PROBE: CPT | Mod: ZL | Performed by: FAMILY MEDICINE

## 2025-06-18 PROCEDURE — 81001 URINALYSIS AUTO W/SCOPE: CPT | Mod: ZL | Performed by: FAMILY MEDICINE

## 2025-06-18 PROCEDURE — G0463 HOSPITAL OUTPT CLINIC VISIT: HCPCS

## 2025-06-18 PROCEDURE — 80307 DRUG TEST PRSMV CHEM ANLYZR: CPT | Mod: ZL | Performed by: FAMILY MEDICINE

## 2025-06-18 PROCEDURE — 36415 COLL VENOUS BLD VENIPUNCTURE: CPT | Mod: ZL | Performed by: FAMILY MEDICINE

## 2025-06-18 PROCEDURE — 82746 ASSAY OF FOLIC ACID SERUM: CPT | Mod: ZL | Performed by: FAMILY MEDICINE

## 2025-06-18 PROCEDURE — 82306 VITAMIN D 25 HYDROXY: CPT | Mod: ZL | Performed by: FAMILY MEDICINE

## 2025-06-18 PROCEDURE — 83036 HEMOGLOBIN GLYCOSYLATED A1C: CPT | Mod: ZL | Performed by: FAMILY MEDICINE

## 2025-06-18 PROCEDURE — 84439 ASSAY OF FREE THYROXINE: CPT | Mod: ZL | Performed by: FAMILY MEDICINE

## 2025-06-18 PROCEDURE — 250N000009 HC RX 250: Performed by: ORTHOPAEDIC SURGERY

## 2025-06-18 PROCEDURE — 84443 ASSAY THYROID STIM HORMONE: CPT | Mod: ZL | Performed by: FAMILY MEDICINE

## 2025-06-18 PROCEDURE — 250N000011 HC RX IP 250 OP 636: Mod: JZ | Performed by: ORTHOPAEDIC SURGERY

## 2025-06-18 PROCEDURE — 80175 DRUG SCREEN QUAN LAMOTRIGINE: CPT | Mod: ZL | Performed by: FAMILY MEDICINE

## 2025-06-18 PROCEDURE — 82607 VITAMIN B-12: CPT | Mod: ZL | Performed by: FAMILY MEDICINE

## 2025-06-18 RX ORDER — ESTRADIOL 0.1 MG/G
CREAM VAGINAL
Qty: 42.5 G | Refills: 5 | Status: SHIPPED | OUTPATIENT
Start: 2025-06-19

## 2025-06-18 RX ORDER — TRIAMCINOLONE ACETONIDE 40 MG/ML
40 INJECTION, SUSPENSION INTRA-ARTICULAR; INTRAMUSCULAR ONCE
Status: COMPLETED | OUTPATIENT
Start: 2025-06-18 | End: 2025-06-18

## 2025-06-18 RX ORDER — CEPHALEXIN 500 MG/1
500 CAPSULE ORAL 2 TIMES DAILY
Qty: 14 CAPSULE | Refills: 0 | Status: SHIPPED | OUTPATIENT
Start: 2025-06-18 | End: 2025-06-25

## 2025-06-18 RX ORDER — LIDOCAINE HYDROCHLORIDE 10 MG/ML
8 INJECTION, SOLUTION INFILTRATION; PERINEURAL ONCE
Status: COMPLETED | OUTPATIENT
Start: 2025-06-18 | End: 2025-06-18

## 2025-06-18 RX ORDER — METRONIDAZOLE 500 MG/1
500 TABLET ORAL 2 TIMES DAILY
Qty: 14 TABLET | Refills: 0 | Status: SHIPPED | OUTPATIENT
Start: 2025-06-18 | End: 2025-06-25

## 2025-06-18 RX ORDER — LORAZEPAM 0.5 MG/1
0.5 TABLET ORAL 2 TIMES DAILY PRN
COMMUNITY
Start: 2025-05-08

## 2025-06-18 RX ADMIN — LIDOCAINE HYDROCHLORIDE 8 ML: 10 INJECTION, SOLUTION INFILTRATION; PERINEURAL at 13:46

## 2025-06-18 RX ADMIN — TRIAMCINOLONE ACETONIDE 40 MG: 40 INJECTION, SUSPENSION INTRA-ARTICULAR; INTRAMUSCULAR at 13:46

## 2025-06-18 ASSESSMENT — PAIN SCALES - GENERAL: PAINLEVEL_OUTOF10: NO PAIN (0)

## 2025-06-18 ASSESSMENT — ENCOUNTER SYMPTOMS
CHILLS: 0
SWEATS: 1
NAUSEA: 0
HEMATURIA: 0
VOMITING: 0
FREQUENCY: 1
FLANK PAIN: 0

## 2025-06-18 NOTE — NURSING NOTE
Patient is here possible uti, having frequent urination, feeling lousy for months, has questions about having labs checked.     No LMP recorded. Patient is postmenopausal.  Medication Reconciliation: complete    Haley Hansen LPN 6/18/2025 10:03 AM       Advance care directive on file? no  Advance care directive provided to patient? no       Haley Hansen LPN

## 2025-06-18 NOTE — PROGRESS NOTES
Assessment & Plan     1. UTI symptoms (Primary)  UA with likely indication for UTI; UC initiated and will confirm within 48 hours.  Will notify on MyChart.  - UA with Microscopic reflex to Culture  - GC/Chlamydia by PCR  - Multiplex Vaginal Panel by PCR  - Urine Culture    2. Hair loss  Has previously had issues with hair thinning; will monitor with basic labs as below; and UDS.  Can consider rogaine treatment if desires.  - CBC and Differential; Future  - Comprehensive Metabolic Panel; Future  - TSH; Future  - T4, Free; Future  - Vitamin B12; Future  - Folic Acid; Future  - Vitamin D Total; Future  - Hemoglobin A1c; Future  - Iron & Iron Binding Capacity; Future  - CBC and Differential  - Comprehensive Metabolic Panel  - TSH  - T4, Free  - Vitamin B12  - Folic Acid  - Vitamin D Total  - Hemoglobin A1c  - Iron & Iron Binding Capacity    3. Other long term (current) drug therapy  Follows with Mental Health and on multiple medications.  Monitoring labs obtained including lamotrigine level in setting of general malaise, hair thinning.  - Vitamin D Total; Future  - Hemoglobin A1c; Future  - Lamotrigine Level; Future  - Iron & Iron Binding Capacity; Future  - Vitamin D Total  - Hemoglobin A1c  - Lamotrigine Level  - Iron & Iron Binding Capacity  - Urine Drug Screen; Future    4. Menopausal vaginal dryness  5. Dyspareunia, female  Chronic, with recurrent cystitis in the last few years.  Discussed R/B/O of estrogen topical treatment and would like to proceed to help reduce frequency of UTIs.  Estradiol topical sent to pharmacy.  Instructed on use; and importance to follow up within 2-3 months with PCP (or sooner) to determine efficacy.  - estradiol (ESTRACE) 0.1 MG/GM vaginal cream; Use 1g vaginally/topically daily x 2 weeks; then reduce to 2x/week.  Dispense: 42.5 g; Refill: 5    6. Acute cystitis without hematuria  As above; UA with evidence of UTI for adult criteria.  UC initiated.  Start Cephelxin; and will determine  "further need after 48 hours.  - cephALEXin (KEFLEX) 500 MG capsule; Take 1 capsule (500 mg) by mouth 2 times daily for 7 days.  Dispense: 14 capsule; Refill: 0  - Urine Drug Screen; Future    7. Bacterial vaginosis  Returned positive on vaginal swab; with most likely cause as her recent sexual contact with her friend.  Rx for Flagyl BID x 7d; avoiding alcohol and intercourse in this time frame.  Follow up prn.  - metroNIDAZOLE (FLAGYL) 500 MG tablet; Take 1 tablet (500 mg) by mouth 2 times daily for 7 days.  Dispense: 14 tablet; Refill: 0    8. History of cocaine abuse (H)  9. Severe bipolar I disorder, current or most recent episode mixed (H)  10. Severe episode of recurrent major depressive disorder, with psychotic features (H)  Has mental health team; with screen for any concern of substance abuse possibly contributing to urinary symptoms, overall general malaise feeling or other issues.  - Urine Drug Screen; Future    11. Hypertension benign essential.  Chronic, meeting criteria.   Will obtain above labs and UDS to determine no other cause.  Would like to have her follow up with PCP to discuss best first treatment option.  Importance of doing so within next month was discussed with patient.      MDM:  Ordering of each unique test  Prescription drug management    BMI  Estimated body mass index is 33.51 kg/m  as calculated from the following:    Height as of 2/21/25: 1.727 m (5' 8\").    Weight as of this encounter: 100 kg (220 lb 6.4 oz).   Weight management plan: Not addressed in acute visit.    Follow up: within ~1-2 months, establishing care with a provider (she enjoyed her visit with Dr. Mariano and requests him for PCP); sooner if failing to improve with treatment interventions.  Instructed on s/s of pyelonephritis.    Barby Rashid is a 59 year old, presenting for the following health issues:  Urinary Problem        6/18/2025     9:57 AM   Additional Questions   Roomed by Haley lee     HPI  " "    Pre-Provider Visit Orders- Urinalysis UA/UC  Patient reports the following symptoms:  possible urinary tract infection (UTI)   Does the patient report any of the following symptoms: vaginal discharge, vaginal itching, possible yeast infection, has a urinary catheter in place, or unable to void in a specimen cup?  Gabriella Rashid DAISY Romero is a 59 year old female who  presents today for a possible UTI. Symptoms of urgency and and just didn't \"feel well\" have been going on for 1 month(s).  Hematuria no.  gradual onset, still present, and intermittent episodesand mild and moderate.  There is no history of fever, chills, nausea or vomiting.  No history of vaginal or penile discharge. This patient does have a history of urinary tract infections; with last 10/2024. Patient denies fever, chills, rigors, flank pain.     Hair loss - over the winter.  She is very distraught over this and would like labs/some looking into the reason/cause for this.  She is in a very stressful situation.  It sounds as if she is living with her mother; and trying to do some caregiving to her.  Does not have a vehicle, and has to be in charge of cutting wood, etc.  Has had a friend from the [employer] come up the last few weekends to help; but then her mom is upset that someone is at their home.  Has siblings in the [facility]; has a son in [place] - encouraged her to reach out to them for help.  Wondering if it could be her thyroid.  Sister has Hashimotos.  Hx of heart palpitations.  Went through menopause in ~2020.  No other rashes or skin changes.  No changes in elimination habits.    Has mental health team (Shayy Cason).  Thought she had seasonal depression, but not imrpoving all that much.  Also wondering if the hair loss could be related to a change they made in some Mental Health medications - encouraged her to reach out to her provider to discuss as well.    Gaining weight; but not eating a lot.    Would like STD testing; as she was " "sexually active with her friend that came [place].  Makes her slightly worried, with him living in [place] and \"doing whatever he wants.\"  No symptoms, besides \"not feeling well.\"      Answers submitted by the patient for this visit:  Painful Urination Questionnaire (Submitted on 6/18/2025)  Chronicity: chronic  Frequency: intermittently  Progression since onset: waxing and waning  Pain quality: aching  Pain - numeric: 3/10  Fever: no fever  Sexually active?: Yes  History of pyelonephritis?: Yes  hematuria: No  chills: No  hesitancy: Yes  discharge: No  frequency: Yes  nausea: No  flank pain: No  possible pregnancy: No  urgency: Yes  sweats: Yes  vomiting: No        Objective    BP (!) 150/92   Pulse 57   Temp 97.3  F (36.3  C) (Tympanic)   Resp 18   Wt 100 kg (220 lb 6.4 oz)   SpO2 99%   Breastfeeding No   BMI 33.51 kg/m    Body mass index is 33.51 kg/m .  Physical Exam   GENERAL: alert, mild distress - tearful and pressured at times during our visit, and over weight  NECK: no adenopathy, no asymmetry, masses, or scars  RESP: lungs clear to auscultation - no rales, rhonchi or wheezes  CV: regular rate and rhythm, normal S1 S2, no S3 or S4, no murmur, click or rub, no peripheral edema  ABDOMEN: soft, nontender, no hepatosplenomegaly, no masses and bowel sounds normal  SKIN: no suspicious lesions or rashes    Results for orders placed or performed in visit on 06/18/25   UA with Microscopic reflex to Culture     Status: Abnormal    Specimen: Urine, Clean Catch   Result Value Ref Range    Color Urine Yellow Colorless, Straw, Light Yellow, Yellow    Appearance Urine Cloudy (A) Clear    Glucose Urine Negative Negative mg/dL    Bilirubin Urine Negative Negative    Ketones Urine Negative Negative mg/dL    Specific Gravity Urine 1.015 1.000 - 1.030    Blood Urine Large (A) Negative    pH Urine 5.5 5.0 - 9.0    Protein Albumin Urine 100 (A) Negative mg/dL    Urobilinogen Urine Normal Normal " mg/dL    Nitrite Urine Positive (A) Negative    Leukocyte Esterase Urine Large (A) Negative    Bacteria Urine Many (A) None Seen /HPF    WBC Clumps Urine Present (A) None Seen /HPF    Mucus Urine Present (A) None Seen /LPF    RBC Urine 40 (H) <=2 /HPF    WBC Urine >182 (H) <=5 /HPF    Narrative    Urine Culture ordered based on laboratory criteria   Comprehensive Metabolic Panel     Status: Abnormal   Result Value Ref Range    Sodium 142 135 - 145 mmol/L    Potassium 4.3 3.4 - 5.3 mmol/L    Carbon Dioxide (CO2) 21 (L) 22 - 29 mmol/L    Anion Gap 11 7 - 15 mmol/L    Urea Nitrogen 30.8 (H) 8.0 - 23.0 mg/dL    Creatinine 1.30 (H) 0.51 - 0.95 mg/dL    GFR Estimate 47 (L) >60 mL/min/1.73m2    Calcium 9.8 8.8 - 10.4 mg/dL    Chloride 110 (H) 98 - 107 mmol/L    Glucose 94 70 - 99 mg/dL    Alkaline Phosphatase 93 40 - 150 U/L    AST 22 0 - 45 U/L    ALT 14 0 - 50 U/L    Protein Total 7.1 6.4 - 8.3 g/dL    Albumin 4.1 3.5 - 5.2 g/dL    Bilirubin Total 0.7 <=1.2 mg/dL   TSH     Status: Normal   Result Value Ref Range    TSH 2.61 0.30 - 4.20 uIU/mL   T4, Free     Status: Normal   Result Value Ref Range    Free T4 1.02 0.90 - 1.70 ng/dL   Vitamin B12     Status: Normal   Result Value Ref Range    Vitamin B12 645 232 - 1,245 pg/mL   Folic Acid     Status: Normal   Result Value Ref Range    Folic Acid >20.0 4.6 - 34.8 ng/mL   Vitamin D Total     Status: Normal   Result Value Ref Range    Vitamin D, Total (25-Hydroxy) 34 20 - 50 ng/mL    Narrative    Season, race, dietary intake, and treatment affect the concentration of 25-hydroxy-Vitamin D. Values may decrease during winter months and increase during summer months.    Vitamin D determination is routinely performed by an immunoassay specific for 25 hydroxyvitamin D3.  If an individual is on vitamin D2(ergocalciferol) supplementation, please specify 25 OH vitamin D2 and D3 level determination by LCMSMS test VITD23.     Hemoglobin A1c     Status: Normal   Result Value Ref Range     Estimated Average Glucose 100 <117 mg/dL    Hemoglobin A1C 5.1 <5.7 %   Iron & Iron Binding Capacity     Status: Normal   Result Value Ref Range    Iron 60 37 - 145 ug/dL    Iron Binding Capacity 288 240 - 430 ug/dL    Iron Sat Index 21 15 - 46 %   CBC with platelets and differential     Status: Abnormal   Result Value Ref Range    WBC Count 12.4 (H) 4.0 - 11.0 10e3/uL    RBC Count 4.48 3.80 - 5.20 10e6/uL    Hemoglobin 13.3 11.7 - 15.7 g/dL    Hematocrit 39.9 35.0 - 47.0 %    MCV 89 78 - 100 fL    MCH 29.7 26.5 - 33.0 pg    MCHC 33.3 31.5 - 36.5 g/dL    RDW 13.1 10.0 - 15.0 %    Platelet Count 214 150 - 450 10e3/uL    % Neutrophils 73 %    % Lymphocytes 18 %    % Monocytes 8 %    % Eosinophils 1 %    % Basophils 0 %    % Immature Granulocytes 0 %    NRBCs per 100 WBC 0 <1 /100    Absolute Neutrophils 9.1 (H) 1.6 - 8.3 10e3/uL    Absolute Lymphocytes 2.2 0.8 - 5.3 10e3/uL    Absolute Monocytes 1.0 0.0 - 1.3 10e3/uL    Absolute Eosinophils 0.1 0.0 - 0.7 10e3/uL    Absolute Basophils 0.1 0.0 - 0.2 10e3/uL    Absolute Immature Granulocytes 0.0 <=0.4 10e3/uL    Absolute NRBCs 0.0 10e3/uL   GC/Chlamydia by PCR     Status: None    Specimen: Vagina; Swab   Result Value Ref Range    Chlamydia Trachomatis Negative Negative    Neisseria gonorrhoeae Negative Negative    CTNG Specimen Source Vagina     Narrative    Assay performed using Cellmax real-time, reverse-transcriptase PCR.   Multiplex Vaginal Panel by PCR     Status: Abnormal    Specimen: Vagina; Swab   Result Value Ref Range    Bacterial Vaginosis Organism DNA Positive (A) Negative    Candida Group DNA Not Detected Not Detected    Candida glabrata / Fallon krusei DNA Not Detected Not Detected    Trichomonas vaginalis DNA Not Detected Not Detected    Narrative    The Xpert  Xpress MVP test, performed on the [employer]  Instrument Systems, is an automated, qualitative in vitro diagnostic test for the detection of DNA targets from anaerobic bacteria associated with  bacterial vaginosis, Candida species associated with vulvovaginal candidiasis, and Trichomonas vaginalis. The assay uses clinician-collected and self-collected vaginal swabs from patients who are symptomatic for vaginitis/ vaginosis. The Xpert  Xpress MVP test utilizes real-time polymerase chain reaction (PCR) for the amplification of specific DNA targets and utilizes fluorogenic target-specific hybridization probes to detect and differentiate DNA. It is intended to aid in the diagnosis of vaginal infections in women with a clinical presentation consistent with bacterial vaginosis, vulvovaginal candidiasis, or trichomoniasis.   The assay targets three anaerobic microorgansims that are associated with bacterial vaginosis (BV). Other organisms that are not detected by the Xpert  Xpress MVP test have also been reported to be associated with BV. The BV organism and Candida species targets of the Xpert  Xpress MVP test can be commensal in women; positive results must be considered in conjunction with other clinical and patient information to determine the disease status.   CBC and Differential     Status: Abnormal    Narrative    The following orders were created for panel order CBC and Differential.  Procedure                               Abnormality         Status                     ---------                               -----------         ------                     CBC with platelets and ...[[id number]]  Abnormal            Final result                 Please view results for these tests on the individual orders.           Signed Electronically by: Kareen Huitron DO

## 2025-06-18 NOTE — PROGRESS NOTES
SUBJECTIVE:  Patient returns with regards to bilateral knee pain.  Patient reports last injection was not as helpful.  Patient is wishing for another injection at this time as she is not at a point where doing operative reconstruction makes sense for her.  Right knee more painful than left.    ROS: Musculoskeletal and general review of systems are negative, per review of previous clinic questionnaire.  Denies SOB and calf pain.    EXAM: Bilateral knee examination shows varus deformity.  Tenderness across medial joint line.  Crepitance with flexion extension also noted.    PROCEDURE NOTE: Bilateral knees are injected with 4 cc 1% lidocaine and 40 mg of Kenalog under sterile conditions.    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  Long-term management joint reconstruction if symptoms continue to progress to over time and the timing works well for patient.    Jose Guadalupe Kenny MD

## 2025-06-19 LAB
AMPHETAMINES UR QL SCN: ABNORMAL
BARBITURATES UR QL SCN: ABNORMAL
BENZODIAZ UR QL SCN: ABNORMAL
BZE UR QL SCN: ABNORMAL
CANNABINOIDS UR QL SCN: ABNORMAL
FENTANYL UR QL: ABNORMAL
OPIATES UR QL SCN: ABNORMAL
PCP QUAL URINE (ROCHE): ABNORMAL

## 2025-06-23 ENCOUNTER — MYC MEDICAL ADVICE (OUTPATIENT)
Dept: FAMILY MEDICINE | Facility: OTHER | Age: 60
End: 2025-06-23
Payer: MEDICARE

## 2025-06-23 NOTE — TELEPHONE ENCOUNTER
"Kamla,    Saw Kareen on 6/18.  Given Keflex for UTI Sx.        On 5th day of 7 day Abx and symptoms not improving.      Should she continue Abx until completed or return to  or clinic for return testing tomorrow when she's in town?     __________________________________________________________________________________    On 5th day of Abx.     2nd day- felt way better.    Since then/today (6/23) urine reddish and darker. Feeling tired and sick in general.  A little burning.       \"I don't have access to treavel to the Doctor, which is why I ignored Sx in the first place\".      \"Do I need a longer course of Abx or a different Abx?\"    _____________________________________________________________________________________    6/18/25  LOV with Elana for UTI symptoms.    Keflex ordered for acute cystitis.      Melissa Hall RN on 6/23/2025 at 2:13 PM    "

## 2025-06-24 NOTE — TELEPHONE ENCOUNTER
Should complete entire course of both antibiotics that were prescribed (Keflex and Flagyl) and if symptoms persist or worsen needs to be seen.   Kamla Paulino PA-C on 6/24/2025 at 7:28 AM

## 2025-07-27 ENCOUNTER — ALLIED HEALTH/NURSE VISIT (OUTPATIENT)
Dept: FAMILY MEDICINE | Facility: OTHER | Age: 60
End: 2025-07-27
Payer: MEDICARE

## 2025-07-27 DIAGNOSIS — R39.9 UTI SYMPTOMS: Primary | ICD-10-CM

## 2025-07-27 DIAGNOSIS — Z48.02 VISIT FOR SUTURE REMOVAL: ICD-10-CM

## 2025-07-27 NOTE — PROCEDURES
Gay VILLA Heather presents to the clinic for removal of sutures. The patient has had sutures in place for 9 days. There has been no patient reported signs or symptoms of infection or drainage. 6 sutures are seen and located on the right hand. 10 sutures put in place on 7/18/2025. Tetanus status is up to date. All sutures were easily removed today and Steri Strips applied. Routine wound care discussed by the RN or provider. The patient will follow up as needed.     Helen Anand LPN 7/27/2025 2:18 PM

## 2025-07-28 ENCOUNTER — OFFICE VISIT (OUTPATIENT)
Dept: FAMILY MEDICINE | Facility: OTHER | Age: 60
End: 2025-07-28
Attending: FAMILY MEDICINE
Payer: MEDICARE

## 2025-07-28 VITALS
SYSTOLIC BLOOD PRESSURE: 126 MMHG | WEIGHT: 227.6 LBS | TEMPERATURE: 97.7 F | RESPIRATION RATE: 20 BRPM | HEART RATE: 62 BPM | BODY MASS INDEX: 34.61 KG/M2 | OXYGEN SATURATION: 99 % | DIASTOLIC BLOOD PRESSURE: 80 MMHG

## 2025-07-28 DIAGNOSIS — R39.9 UTI SYMPTOMS: Primary | ICD-10-CM

## 2025-07-28 DIAGNOSIS — S65.311S: ICD-10-CM

## 2025-07-28 LAB
ALBUMIN UR-MCNC: 10 MG/DL
APPEARANCE UR: CLEAR
BILIRUB UR QL STRIP: NEGATIVE
COLOR UR AUTO: YELLOW
GLUCOSE UR STRIP-MCNC: NEGATIVE MG/DL
HGB UR QL STRIP: NEGATIVE
HYALINE CASTS: 4 /LPF
KETONES UR STRIP-MCNC: NEGATIVE MG/DL
LEUKOCYTE ESTERASE UR QL STRIP: NEGATIVE
MUCOUS THREADS #/AREA URNS LPF: PRESENT /LPF
NITRATE UR QL: NEGATIVE
PH UR STRIP: 6.5 [PH] (ref 5–9)
RBC URINE: 1 /HPF
SP GR UR STRIP: 1.03 (ref 1–1.03)
SQUAMOUS EPITHELIAL: 14 /HPF
UROBILINOGEN UR STRIP-MCNC: 2 MG/DL
WBC URINE: 2 /HPF

## 2025-07-28 PROCEDURE — G0463 HOSPITAL OUTPT CLINIC VISIT: HCPCS | Performed by: FAMILY MEDICINE

## 2025-07-28 PROCEDURE — 3079F DIAST BP 80-89 MM HG: CPT | Performed by: FAMILY MEDICINE

## 2025-07-28 PROCEDURE — 81003 URINALYSIS AUTO W/O SCOPE: CPT | Mod: ZL | Performed by: FAMILY MEDICINE

## 2025-07-28 PROCEDURE — 99213 OFFICE O/P EST LOW 20 MIN: CPT | Performed by: FAMILY MEDICINE

## 2025-07-28 PROCEDURE — 3074F SYST BP LT 130 MM HG: CPT | Performed by: FAMILY MEDICINE

## 2025-07-28 PROCEDURE — 1126F AMNT PAIN NOTED NONE PRSNT: CPT | Performed by: FAMILY MEDICINE

## 2025-07-28 ASSESSMENT — PAIN SCALES - GENERAL: PAINLEVEL_OUTOF10: NO PAIN (0)

## 2025-07-28 NOTE — NURSING NOTE
"Chief Complaint   Patient presents with    RECHECK     UTI recheck after finishing antibiotics   Also check recent lesion as it has opened again.        Initial /80 (BP Location: Right arm, Patient Position: Sitting, Cuff Size: Adult Regular)   Pulse 62   Temp 97.7  F (36.5  C) (Tympanic)   Resp 20   Wt 103.2 kg (227 lb 9.6 oz)   SpO2 99%   BMI 34.61 kg/m   Estimated body mass index is 34.61 kg/m  as calculated from the following:    Height as of 7/18/25: 1.727 m (5' 8\").    Weight as of this encounter: 103.2 kg (227 lb 9.6 oz).  Medication Reconciliation: complete    Danya Ring LPN    Advance Care Directive reviewed    "

## 2025-07-28 NOTE — PROGRESS NOTES
Assessment & Plan       ICD-10-CM    1. UTI symptoms  R39.9 UA with Microscopic reflex to Culture     UA with Microscopic reflex to Culture      2. Laceration of deep palmar arch of right hand, sequela  S65.311S         Patient is reassured by relatively normal UA results.  No culture indicated.    At this point, I would not recommend any further attempts to close the wound on her hand. Because of the location, I don't think anything will secure the wound edges together.  Discussed expectations of healing by secondary intention.  This is dressed with bacitracin covered with a nonadherent gauze and then wrapped with gauze followed by a Ace bandage.  Patient should keep it covered during the day.  She can consider letting it air out and dry at night.  Discussed importance of keeping it clean, signs of infection.  But would anticipate that it will continue to heal.        Barby Rashid is a 60 year old, presenting for the following health issues:  RECHECK (UTI recheck after finishing antibiotics /Also check recent lesion as it has opened again. )        7/28/2025     2:28 PM   Additional Questions   Roomed by Danya     History of Present Illness       Reason for visit:  Uti recheck    She eats 2-3 servings of fruits and vegetables daily.She consumes 0 sweetened beverage(s) daily.She exercises with enough effort to increase her heart rate 9 or less minutes per day.  She exercises with enough effort to increase her heart rate 3 or less days per week.   She is taking medications regularly.        Patient has recently had issues with bladder infection.  She was initially treated with Keflex and then Augmentin.  She just finished her last antibiotic yesterday.  She is not currently having symptoms, but because she was coming in with her mom, she requested to have a repeat urine done.    She recently sustained a laceration to the thenar eminence of her right hand.  She originally had sutures placed, but after  removal of the wound opened.  She then had Steri-Strips placed, but these did not stay in place.  No drainage.  No signs of infection.            Objective    /80 (BP Location: Right arm, Patient Position: Sitting, Cuff Size: Adult Regular)   Pulse 62   Temp 97.7  F (36.5  C) (Tympanic)   Resp 20   Wt 103.2 kg (227 lb 9.6 oz)   SpO2 99%   BMI 34.61 kg/m    Body mass index is 34.61 kg/m .  Physical Exam  Constitutional:       Appearance: She is well-developed.   HENT:      Right Ear: External ear normal.      Left Ear: External ear normal.   Eyes:      General: No scleral icterus.     Conjunctiva/sclera: Conjunctivae normal.   Cardiovascular:      Rate and Rhythm: Normal rate.   Pulmonary:      Effort: Pulmonary effort is normal. No respiratory distress.   Skin:     Findings: No rash.      Comments: Deep laceration of the right thenar eminence.  Evidence of healing with good granulation tissue.  No surrounding erythema, no drainage.   Neurological:      Mental Status: She is alert.          Results for orders placed or performed in visit on 07/28/25   UA with Microscopic reflex to Culture     Status: Abnormal    Specimen: Urine, Midstream   Result Value Ref Range    Color Urine Yellow Colorless, Straw, Light Yellow, Yellow    Appearance Urine Clear Clear    Glucose Urine Negative Negative mg/dL    Bilirubin Urine Negative Negative    Ketones Urine Negative Negative mg/dL    Specific Gravity Urine 1.030 1.000 - 1.030    Blood Urine Negative Negative    pH Urine 6.5 5.0 - 9.0    Protein Albumin Urine 10 (A) Negative mg/dL    Urobilinogen Urine 2.0 (A) Normal mg/dL    Nitrite Urine Negative Negative    Leukocyte Esterase Urine Negative Negative    Mucus Urine Present (A) None Seen /LPF    RBC Urine 1 <=2 /HPF    WBC Urine 2 <=5 /HPF    Squamous Epithelials Urine 14 (H) <=1 /HPF    Hyaline Casts Urine 4 (H) <=2 /LPF    Narrative    Urine Culture not indicated           Signed Electronically by: Elda Clemente  MD
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
## 2025-08-08 DIAGNOSIS — M53.3 PAIN IN THE COCCYX: ICD-10-CM

## 2025-08-11 DIAGNOSIS — M53.3 PAIN IN THE COCCYX: ICD-10-CM
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
## 2025-08-11 RX ORDER — DICLOFENAC SODIUM 75 MG/1
75 TABLET, DELAYED RELEASE ORAL 2 TIMES DAILY
Qty: 40 TABLET | Refills: 0 | OUTPATIENT
Start: 2025-08-11

## 2025-08-11 RX ORDER — DICLOFENAC SODIUM 75 MG/1
75 TABLET, DELAYED RELEASE ORAL 2 TIMES DAILY
Qty: 40 TABLET | Refills: 0 | Status: SHIPPED | OUTPATIENT
Start: 2025-08-11

## (undated) RX ORDER — LIDOCAINE HYDROCHLORIDE 10 MG/ML
INJECTION, SOLUTION INFILTRATION; PERINEURAL
Status: DISPENSED
Start: 2025-06-18

## (undated) RX ORDER — TRIAMCINOLONE ACETONIDE 40 MG/ML
INJECTION, SUSPENSION INTRA-ARTICULAR; INTRAMUSCULAR
Status: DISPENSED
Start: 2025-03-05

## (undated) RX ORDER — METRONIDAZOLE 500 MG/1
TABLET ORAL
Status: DISPENSED
Start: 2024-03-25

## (undated) RX ORDER — TRIAMCINOLONE ACETONIDE 40 MG/ML
INJECTION, SUSPENSION INTRA-ARTICULAR; INTRAMUSCULAR
Status: DISPENSED
Start: 2025-06-18

## (undated) RX ORDER — GINSENG 100 MG
CAPSULE ORAL
Status: DISPENSED
Start: 2025-07-28

## (undated) RX ORDER — CEFTRIAXONE SODIUM 1 G
VIAL (EA) INJECTION
Status: DISPENSED
Start: 2024-03-25

## (undated) RX ORDER — TRIAMCINOLONE ACETONIDE 40 MG/ML
INJECTION, SUSPENSION INTRA-ARTICULAR; INTRAMUSCULAR
Status: DISPENSED
Start: 2024-12-04

## (undated) RX ORDER — LIDOCAINE HYDROCHLORIDE 10 MG/ML
INJECTION, SOLUTION INFILTRATION; PERINEURAL
Status: DISPENSED
Start: 2025-03-05

## (undated) RX ORDER — CEFTRIAXONE 2 G/1
INJECTION, POWDER, FOR SOLUTION INTRAMUSCULAR; INTRAVENOUS
Status: DISPENSED
Start: 2024-03-25

## (undated) RX ORDER — LIDOCAINE HYDROCHLORIDE 10 MG/ML
INJECTION, SOLUTION INFILTRATION; PERINEURAL
Status: DISPENSED
Start: 2024-12-04